# Patient Record
Sex: MALE | Race: WHITE | Employment: OTHER | URBAN - METROPOLITAN AREA
[De-identification: names, ages, dates, MRNs, and addresses within clinical notes are randomized per-mention and may not be internally consistent; named-entity substitution may affect disease eponyms.]

---

## 2017-01-01 ENCOUNTER — HOSPITAL ENCOUNTER (OUTPATIENT)
Dept: OCCUPATIONAL THERAPY | Facility: CLINIC | Age: 77
Setting detail: THERAPIES SERIES
End: 2017-12-14
Attending: INTERNAL MEDICINE
Payer: MEDICARE

## 2017-01-01 ENCOUNTER — TELEPHONE (OUTPATIENT)
Dept: INTERNAL MEDICINE | Facility: CLINIC | Age: 77
End: 2017-01-01

## 2017-01-01 ENCOUNTER — TELEPHONE (OUTPATIENT)
Dept: FAMILY MEDICINE | Facility: CLINIC | Age: 77
End: 2017-01-01

## 2017-01-01 ENCOUNTER — HOSPITAL ENCOUNTER (OUTPATIENT)
Dept: OCCUPATIONAL THERAPY | Facility: CLINIC | Age: 77
Setting detail: THERAPIES SERIES
End: 2017-12-22
Attending: INTERNAL MEDICINE
Payer: MEDICARE

## 2017-01-01 ENCOUNTER — TRANSFERRED RECORDS (OUTPATIENT)
Dept: HEALTH INFORMATION MANAGEMENT | Facility: CLINIC | Age: 77
End: 2017-01-01

## 2017-01-01 ENCOUNTER — OFFICE VISIT (OUTPATIENT)
Dept: INTERNAL MEDICINE | Facility: CLINIC | Age: 77
End: 2017-01-01
Payer: MEDICARE

## 2017-01-01 VITALS
DIASTOLIC BLOOD PRESSURE: 66 MMHG | RESPIRATION RATE: 16 BRPM | HEART RATE: 80 BPM | OXYGEN SATURATION: 86 % | WEIGHT: 242 LBS | SYSTOLIC BLOOD PRESSURE: 118 MMHG | TEMPERATURE: 98.1 F | BODY MASS INDEX: 36.8 KG/M2

## 2017-01-01 DIAGNOSIS — D50.8 OTHER IRON DEFICIENCY ANEMIA: ICD-10-CM

## 2017-01-01 DIAGNOSIS — E87.6 LOW BLOOD POTASSIUM: Primary | ICD-10-CM
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
## 2017-01-01 DIAGNOSIS — E11.649 TYPE 2 DIABETES MELLITUS WITH HYPOGLYCEMIA WITHOUT COMA, WITH LONG-TERM CURRENT USE OF INSULIN (H): Primary | ICD-10-CM

## 2017-01-01 DIAGNOSIS — E87.6 LOW BLOOD POTASSIUM: ICD-10-CM

## 2017-01-01 DIAGNOSIS — I50.9 ACUTE ON CHRONIC CONGESTIVE HEART FAILURE, UNSPECIFIED CONGESTIVE HEART FAILURE TYPE: ICD-10-CM

## 2017-01-01 DIAGNOSIS — E11.649 TYPE 2 DIABETES MELLITUS WITH HYPOGLYCEMIA WITHOUT COMA (H): ICD-10-CM

## 2017-01-01 DIAGNOSIS — E87.6 HYPOKALEMIA: ICD-10-CM

## 2017-01-01 DIAGNOSIS — E11.649 TYPE 2 DIABETES MELLITUS WITH HYPOGLYCEMIA WITHOUT COMA, UNSPECIFIED LONG TERM INSULIN USE STATUS: Primary | ICD-10-CM

## 2017-01-01 DIAGNOSIS — Z79.4 TYPE 2 DIABETES MELLITUS WITH HYPOGLYCEMIA WITHOUT COMA, WITH LONG-TERM CURRENT USE OF INSULIN (H): Primary | ICD-10-CM

## 2017-01-01 DIAGNOSIS — E78.5 HYPERLIPIDEMIA LDL GOAL <100: ICD-10-CM

## 2017-01-01 DIAGNOSIS — E87.6 HYPOKALEMIA: Primary | ICD-10-CM

## 2017-01-01 LAB
ALBUMIN SERPL-MCNC: 3.5 G/DL (ref 3.4–5)
ALP SERPL-CCNC: 79 U/L (ref 40–150)
ALT SERPL W P-5'-P-CCNC: 16 U/L (ref 0–70)
ANION GAP SERPL CALCULATED.3IONS-SCNC: 6 MMOL/L (ref 3–14)
ANION GAP SERPL CALCULATED.3IONS-SCNC: 6 MMOL/L (ref 3–14)
ANION GAP SERPL CALCULATED.3IONS-SCNC: 8 MMOL/L (ref 3–14)
ANION GAP SERPL CALCULATED.3IONS-SCNC: 9 MMOL/L (ref 3–14)
AST SERPL W P-5'-P-CCNC: 20 U/L (ref 0–45)
BILIRUB SERPL-MCNC: 0.7 MG/DL (ref 0.2–1.3)
BUN SERPL-MCNC: 14 MG/DL (ref 7–30)
BUN SERPL-MCNC: 50 MG/DL (ref 7–30)
BUN SERPL-MCNC: 51 MG/DL (ref 7–30)
BUN SERPL-MCNC: 52 MG/DL (ref 7–30)
CALCIUM SERPL-MCNC: 8.5 MG/DL (ref 8.5–10.1)
CALCIUM SERPL-MCNC: 8.6 MG/DL (ref 8.5–10.1)
CALCIUM SERPL-MCNC: 8.7 MG/DL (ref 8.5–10.1)
CALCIUM SERPL-MCNC: 8.8 MG/DL (ref 8.5–10.1)
CHLORIDE SERPL-SCNC: 101 MMOL/L (ref 94–109)
CHLORIDE SERPL-SCNC: 92 MMOL/L (ref 94–109)
CHLORIDE SERPL-SCNC: 93 MMOL/L (ref 94–109)
CHLORIDE SERPL-SCNC: 95 MMOL/L (ref 94–109)
CO2 SERPL-SCNC: 34 MMOL/L (ref 20–32)
CO2 SERPL-SCNC: 35 MMOL/L (ref 20–32)
CO2 SERPL-SCNC: 37 MMOL/L (ref 20–32)
CO2 SERPL-SCNC: 38 MMOL/L (ref 20–32)
CREAT SERPL-MCNC: 0.8 MG/DL (ref 0.66–1.25)
CREAT SERPL-MCNC: 0.94 MG/DL (ref 0.66–1.25)
CREAT SERPL-MCNC: 1.07 MG/DL (ref 0.66–1.25)
CREAT SERPL-MCNC: 1.19 MG/DL (ref 0.66–1.25)
ERYTHROCYTE [DISTWIDTH] IN BLOOD BY AUTOMATED COUNT: 15.1 % (ref 10–15)
GFR SERPL CREATININE-BSD FRML MDRD: 59 ML/MIN/1.7M2
GFR SERPL CREATININE-BSD FRML MDRD: 67 ML/MIN/1.7M2
GFR SERPL CREATININE-BSD FRML MDRD: 77 ML/MIN/1.7M2
GFR SERPL CREATININE-BSD FRML MDRD: ABNORMAL ML/MIN/1.7M2
GLUCOSE SERPL-MCNC: 114 MG/DL (ref 70–99)
GLUCOSE SERPL-MCNC: 161 MG/DL (ref 70–99)
GLUCOSE SERPL-MCNC: 86 MG/DL (ref 70–99)
GLUCOSE SERPL-MCNC: 89 MG/DL (ref 70–99)
HBA1C MFR BLD: 6.1 % (ref 4.3–6)
HCT VFR BLD AUTO: 49.4 % (ref 40–53)
HGB BLD-MCNC: 15.8 G/DL (ref 13.3–17.7)
MCH RBC QN AUTO: 31.5 PG (ref 26.5–33)
MCHC RBC AUTO-ENTMCNC: 32 G/DL (ref 31.5–36.5)
MCV RBC AUTO: 99 FL (ref 78–100)
PLATELET # BLD AUTO: 169 10E9/L (ref 150–450)
POTASSIUM SERPL-SCNC: 2.6 MMOL/L (ref 3.4–5.3)
POTASSIUM SERPL-SCNC: 2.7 MMOL/L (ref 3.4–5.3)
POTASSIUM SERPL-SCNC: 2.8 MMOL/L (ref 3.4–5.3)
POTASSIUM SERPL-SCNC: 3.2 MMOL/L (ref 3.4–5.3)
POTASSIUM SERPL-SCNC: 3.7 MMOL/L (ref 3.4–5.3)
PROT SERPL-MCNC: 7.7 G/DL (ref 6.8–8.8)
RBC # BLD AUTO: 5.01 10E12/L (ref 4.4–5.9)
SODIUM SERPL-SCNC: 136 MMOL/L (ref 133–144)
SODIUM SERPL-SCNC: 137 MMOL/L (ref 133–144)
SODIUM SERPL-SCNC: 138 MMOL/L (ref 133–144)
SODIUM SERPL-SCNC: 143 MMOL/L (ref 133–144)
TSH SERPL DL<=0.005 MIU/L-ACNC: 1.02 MU/L (ref 0.4–4)
WBC # BLD AUTO: 6.9 10E9/L (ref 4–11)

## 2017-01-01 PROCEDURE — 80048 BASIC METABOLIC PNL TOTAL CA: CPT | Performed by: INTERNAL MEDICINE

## 2017-01-01 PROCEDURE — 80048 BASIC METABOLIC PNL TOTAL CA: CPT | Mod: GW | Performed by: INTERNAL MEDICINE

## 2017-01-01 PROCEDURE — 80053 COMPREHEN METABOLIC PANEL: CPT | Mod: GW | Performed by: INTERNAL MEDICINE

## 2017-01-01 PROCEDURE — 83036 HEMOGLOBIN GLYCOSYLATED A1C: CPT | Mod: GW | Performed by: INTERNAL MEDICINE

## 2017-01-01 PROCEDURE — 36415 COLL VENOUS BLD VENIPUNCTURE: CPT | Performed by: INTERNAL MEDICINE

## 2017-01-01 PROCEDURE — 40000125 ZZHC STATISTIC OT OUTPT VISIT: Performed by: OCCUPATIONAL THERAPIST

## 2017-01-01 PROCEDURE — 84443 ASSAY THYROID STIM HORMONE: CPT | Mod: GW | Performed by: INTERNAL MEDICINE

## 2017-01-01 PROCEDURE — 85027 COMPLETE CBC AUTOMATED: CPT | Mod: GW | Performed by: INTERNAL MEDICINE

## 2017-01-01 PROCEDURE — G9169 MEMORY GOAL STATUS: HCPCS | Mod: GO,CJ | Performed by: OCCUPATIONAL THERAPIST

## 2017-01-01 PROCEDURE — 99214 OFFICE O/P EST MOD 30 MIN: CPT | Mod: GW | Performed by: INTERNAL MEDICINE

## 2017-01-01 PROCEDURE — 97535 SELF CARE MNGMENT TRAINING: CPT | Mod: GO

## 2017-01-01 PROCEDURE — 97112 NEUROMUSCULAR REEDUCATION: CPT | Mod: GO

## 2017-01-01 PROCEDURE — 40000125 ZZHC STATISTIC OT OUTPT VISIT

## 2017-01-01 PROCEDURE — 97532 ZZHC OT COGNITIVE SKILLS EA 15 MIN: CPT | Mod: GO | Performed by: OCCUPATIONAL THERAPIST

## 2017-01-01 PROCEDURE — 84132 ASSAY OF SERUM POTASSIUM: CPT | Performed by: INTERNAL MEDICINE

## 2017-01-01 PROCEDURE — 97166 OT EVAL MOD COMPLEX 45 MIN: CPT | Mod: GO | Performed by: OCCUPATIONAL THERAPIST

## 2017-01-01 PROCEDURE — G9168 MEMORY CURRENT STATUS: HCPCS | Mod: GO,CK | Performed by: OCCUPATIONAL THERAPIST

## 2017-01-01 RX ORDER — EXENATIDE 250 UG/ML
INJECTION SUBCUTANEOUS
Qty: 7.2 ML | Refills: 1 | Status: SHIPPED | OUTPATIENT
Start: 2017-01-01 | End: 2018-01-01

## 2017-01-01 RX ORDER — POTASSIUM CHLORIDE 1500 MG/1
20 TABLET, EXTENDED RELEASE ORAL DAILY
Qty: 90 TABLET | Refills: 3 | Status: SHIPPED | OUTPATIENT
Start: 2017-01-01 | End: 2018-01-01 | Stop reason: DRUGHIGH

## 2017-01-01 RX ORDER — PRAVASTATIN SODIUM 80 MG/1
80 TABLET ORAL DAILY
Qty: 90 TABLET | Refills: 3 | Status: SHIPPED | OUTPATIENT
Start: 2017-01-01

## 2017-01-01 RX ORDER — BLOOD SUGAR DIAGNOSTIC
STRIP MISCELLANEOUS
Qty: 5 BOX | Refills: 3 | Status: SHIPPED | OUTPATIENT
Start: 2017-01-01

## 2017-01-01 ASSESSMENT — PAIN SCALES - GENERAL: PAINLEVEL: NO PAIN (0)

## 2017-01-10 ENCOUNTER — TRANSFERRED RECORDS (OUTPATIENT)
Dept: HEALTH INFORMATION MANAGEMENT | Facility: CLINIC | Age: 77
End: 2017-01-10

## 2017-01-13 DIAGNOSIS — F43.21 ADJUSTMENT DISORDER WITH DEPRESSED MOOD: Primary | ICD-10-CM

## 2017-01-26 DIAGNOSIS — Z79.4 TYPE 2 DIABETES MELLITUS WITH HYPOGLYCEMIA WITHOUT COMA, WITH LONG-TERM CURRENT USE OF INSULIN (H): Primary | ICD-10-CM

## 2017-01-26 DIAGNOSIS — E11.649 TYPE 2 DIABETES MELLITUS WITH HYPOGLYCEMIA WITHOUT COMA, WITH LONG-TERM CURRENT USE OF INSULIN (H): Primary | ICD-10-CM

## 2017-01-26 NOTE — TELEPHONE ENCOUNTER
Audra         Last Written Prescription Date: 11/13/2015  Last Fill Quantity: 3 m, # refills: 3  Last Office Visit with G, P or [facility] prescribing provider:  9/28/2016        BP Readings from Last 3 Encounters:   09/28/16 98/58   09/15/16 90/52   08/02/16 110/64     MICROL       18   6/10/2015  No results found for this basename: microalbumin  CREATININE   Date Value Ref Range Status   09/30/2016 1.94* 0.66 - 1.25 mg/dL Final   ]  GFR ESTIMATE   Date Value Ref Range Status   09/30/2016 34* >60 mL/min/1.7m2 Final     Comment:     Non  GFR Calc   09/28/2016 34* >60 mL/min/1.7m2 Final     Comment:     Non  GFR Calc   08/29/2016 54* >60 mL/min/1.7m2 Final     Comment:     Non  GFR Calc     GFR ESTIMATE IF BLACK   Date Value Ref Range Status   09/30/2016 41* >60 mL/min/1.7m2 Final     Comment:      GFR Calc   09/28/2016 42* >60 mL/min/1.7m2 Final     Comment:      GFR Calc   08/29/2016 66 >60 mL/min/1.7m2 Final     Comment:      GFR Calc     CHOL      129   6/10/2015  HDL       29   6/10/2015  LDL       68   6/10/2015  TRIG      162   6/10/2015  CHOLHDLRATIO      4.4   6/10/2015  AST       22   8/2/2016  ALT       23   8/2/2016  A1C      6.1   7/8/2016  A1C      5.6   3/4/2016  A1C      5.6   10/6/2015  A1C      6.7   6/10/2015  A1C      7.4   3/25/2014  POTASSIUM   Date Value Ref Range Status   09/30/2016 5.7* 3.4 - 5.3 mmol/L Final

## 2017-01-27 NOTE — TELEPHONE ENCOUNTER
Lantus  Routing refill request to provider for review/approval because:  Labs out of range:  Microalbumin  Labs not current:  Microalbumin, Hgb A1C - - future labs ordered, routing to schedulers also    Doug Graham RN, BSN

## 2017-02-15 ENCOUNTER — TELEPHONE (OUTPATIENT)
Dept: FAMILY MEDICINE | Facility: CLINIC | Age: 77
End: 2017-02-15

## 2017-02-15 NOTE — TELEPHONE ENCOUNTER
Reason for Call:  Other call back    Detailed comments: Patient's homecare nurse bella wanted to talk to Dr. Khan about giving linda methadone for his back pain. Please call bella.    Phone Number Patient can be reached at: Other phone number:  [phone]    Best Time: any    Can we leave a detailed message on this number? NO    Call taken on 2/15/2017 at 5:06 PM by Debra Ribeiro

## 2017-02-21 ENCOUNTER — TELEPHONE (OUTPATIENT)
Dept: INTERNAL MEDICINE | Facility: CLINIC | Age: 77
End: 2017-02-21

## 2017-02-21 ENCOUNTER — OFFICE VISIT (OUTPATIENT)
Dept: INTERNAL MEDICINE | Facility: CLINIC | Age: 77
End: 2017-02-21
Payer: COMMERCIAL

## 2017-02-21 VITALS
DIASTOLIC BLOOD PRESSURE: 66 MMHG | TEMPERATURE: 97.6 F | OXYGEN SATURATION: 86 % | BODY MASS INDEX: 34.71 KG/M2 | HEIGHT: 68 IN | RESPIRATION RATE: 16 BRPM | SYSTOLIC BLOOD PRESSURE: 108 MMHG | HEART RATE: 76 BPM | WEIGHT: 229 LBS

## 2017-02-21 DIAGNOSIS — M15.9 OSTEOARTHRITIS OF MULTIPLE JOINTS, UNSPECIFIED OSTEOARTHRITIS TYPE: ICD-10-CM

## 2017-02-21 DIAGNOSIS — E66.01 MORBID OBESITY, UNSPECIFIED OBESITY TYPE (H): ICD-10-CM

## 2017-02-21 DIAGNOSIS — J44.1 COPD EXACERBATION (H): Primary | ICD-10-CM

## 2017-02-21 DIAGNOSIS — I10 ESSENTIAL HYPERTENSION WITH GOAL BLOOD PRESSURE LESS THAN 140/90: ICD-10-CM

## 2017-02-21 DIAGNOSIS — R60.1 GENERALIZED EDEMA: ICD-10-CM

## 2017-02-21 DIAGNOSIS — E11.9 TYPE 2 DIABETES MELLITUS WITHOUT COMPLICATION, WITHOUT LONG-TERM CURRENT USE OF INSULIN (H): ICD-10-CM

## 2017-02-21 DIAGNOSIS — J43.9 PULMONARY EMPHYSEMA, UNSPECIFIED EMPHYSEMA TYPE (H): ICD-10-CM

## 2017-02-21 LAB
ANION GAP SERPL CALCULATED.3IONS-SCNC: 5 MMOL/L (ref 3–14)
BUN SERPL-MCNC: 23 MG/DL (ref 7–30)
CALCIUM SERPL-MCNC: 8.8 MG/DL (ref 8.5–10.1)
CHLORIDE SERPL-SCNC: 98 MMOL/L (ref 94–109)
CO2 SERPL-SCNC: 36 MMOL/L (ref 20–32)
CREAT SERPL-MCNC: 0.7 MG/DL (ref 0.66–1.25)
GFR SERPL CREATININE-BSD FRML MDRD: ABNORMAL ML/MIN/1.7M2
GLUCOSE SERPL-MCNC: 111 MG/DL (ref 70–99)
HBA1C MFR BLD: 5.2 % (ref 4.3–6)
POTASSIUM SERPL-SCNC: 3.7 MMOL/L (ref 3.4–5.3)
SODIUM SERPL-SCNC: 139 MMOL/L (ref 133–144)

## 2017-02-21 PROCEDURE — 80048 BASIC METABOLIC PNL TOTAL CA: CPT | Mod: GV | Performed by: INTERNAL MEDICINE

## 2017-02-21 PROCEDURE — 36415 COLL VENOUS BLD VENIPUNCTURE: CPT | Performed by: INTERNAL MEDICINE

## 2017-02-21 PROCEDURE — 99214 OFFICE O/P EST MOD 30 MIN: CPT | Mod: GV | Performed by: INTERNAL MEDICINE

## 2017-02-21 PROCEDURE — 83036 HEMOGLOBIN GLYCOSYLATED A1C: CPT | Mod: GV | Performed by: INTERNAL MEDICINE

## 2017-02-21 RX ORDER — GUAIFENESIN 600 MG/1
600 TABLET, EXTENDED RELEASE ORAL 2 TIMES DAILY PRN
COMMUNITY

## 2017-02-21 RX ORDER — DOXYCYCLINE 100 MG/1
100 CAPSULE ORAL 2 TIMES DAILY
Qty: 20 CAPSULE | Refills: 0 | Status: SHIPPED | OUTPATIENT
Start: 2017-02-21 | End: 2017-05-05

## 2017-02-21 RX ORDER — TRAZODONE HYDROCHLORIDE 50 MG/1
TABLET, FILM COATED ORAL AT BEDTIME
COMMUNITY

## 2017-02-21 ASSESSMENT — PAIN SCALES - GENERAL: PAINLEVEL: SEVERE PAIN (7)

## 2017-02-21 NOTE — MR AVS SNAPSHOT
"              After Visit Summary   2017    Geo Martinez    MRN: [id number]           Patient Information     Date Of Birth          1940        Visit Information        Provider Department      2017 11:00 AM Bob Khan MD [facility]         Follow-ups after your visit        Who to contact     If you have questions or need follow up information about today's clinic visit or your schedule please contact [facility] directly at [phone].  Normal or non-critical lab and imaging results will be communicated to you by [facility], letter or phone within 4 business days after the clinic has received the results. If you do not hear from us within 7 days, please contact the clinic through [facility] or phone. If you have a critical or abnormal lab result, we will notify you by phone as soon as possible.  Submit refill requests through [facility] or call your pharmacy and they will forward the refill request to us. Please allow 3 business days for your refill to be completed.          Additional Information About Your Visit        [facility] Information     [facility] lets you send messages to your doctor, view your test results, renew your prescriptions, schedule appointments and more. To sign up, go to [email or web address] . Click on \"Log in\" on the left side of the screen, which will take you to the Welcome page. Then click on \"Sign up Now\" on the right side of the page.     You will be asked to enter the access code listed below, as well as some personal information. Please follow the directions to create your username and password.     Your access code is: [email or web address]  Expires: 2017 11:01 AM     Your access code will  in 90 days. If you need help or a new code, please call your [facility] or [phone].        Care EveryWhere ID     This is your Care EveryWhere ID. This could be used by other organizations to access your [facility] medical " "records  [id number]        Your Vitals Were     Pulse Temperature Respirations Height Pulse Oximetry BMI (Body Mass Index)    76 97.6  F (36.4  C) (Temporal) 16 5' 8\" (1.727 m) 86% 34.82 kg/m2       Blood Pressure from Last 3 Encounters:   02/21/17 108/66   09/28/16 98/58   09/15/16 90/52    Weight from Last 3 Encounters:   02/21/17 229 lb (103.9 kg)   09/28/16 226 lb (102.5 kg)   09/15/16 228 lb (103.4 kg)              Today, you had the following     No orders found for display         Today's Medication Changes          These changes are accurate as of: 2/21/17 11:01 AM.  If you have any questions, ask your nurse or doctor.               Stop taking these medicines if you haven't already. Please contact your care team if you have questions.     spironolactone 25 MG tablet   Commonly known as:  ALDACTONE   Stopped by:  Bob Khan MD           zolpidem 10 MG tablet   Commonly known as:  AMBIEN   Stopped by:  Bob Khan MD                    Primary Care Provider Office Phone # Fax #    Bob Khan [phone]       [facility] [address] [place] [address]        Goals        General    I would like a visitor in my home so I am not as lonely/Start Date 10/26/2015 (pt-stated)     Notes - Note created  10/27/2015  1:47 PM by Alexia Daily MSW    As of today's date 10/27/2015 goal is met at 26 - 50%.   Goal Status:  Active  Patient signed Consent to Communicate for me to make a referral for him to have a Senior  through [facility]. Patient also has a  come into his home weekly, as well as family members and friends either call, or stop in during the week.          Thank you!     Thank you for choosing [facility]  for your care. Our goal is always to provide you with excellent care. Hearing back from our patients is one way we can continue to improve our services. Please take a few minutes to complete the " written survey that you may receive in the mail after your visit with us. Thank you!             Your Updated Medication List - Protect others around you: Learn how to safely use, store and throw away your medicines at www.disposemymeds.org.          This list is accurate as of: 2/21/17 11:01 AM.  Always use your most recent med list.                   Brand Name Dispense Instructions for use    albuterol 108 (90 BASE) MCG/ACT Inhaler    PROAIR HFA/PROVENTIL HFA/VENTOLIN HFA    1 Inhaler    Inhale 2 puffs into the lungs 4 times daily as needed for shortness of breath / dyspnea       allopurinol 100 MG tablet    ZYLOPRIM    90 tablet    Take 1 tablet (100 mg) by mouth daily       aspirin 81 MG EC tablet     90 tablet    Take 1 tablet (81 mg) by mouth daily       blood glucose monitoring lancets     102 each    Use to test blood sugar three times daily or as directed.       blood glucose monitoring meter device kit     1 kit    Use to test blood sugars 5 times daily or as directed.       carvedilol 6.25 MG tablet    COREG    540 tablet    Take three tablets three times a day       colchicine 0.6 MG tablet    COLCRYS    10 tablet    Take 2 tablets at the first sign of flare, take 1 additional tablet one hour later.       exenatide 10 MCG/0.04ML injection    BYETTA 10 MCG PEN    3 Month    INJECT 10MCG SUBCUTANEOUS TWICE A DAY       ferrous sulfate 325 (65 FE) MG tablet    IRON     Take 325 mg by mouth daily (with breakfast)       Fish Oil 1200 MG Caps      Take 1 capsule by mouth daily       FLUoxetine 20 MG capsule    PROzac    90 capsule    Take 1 capsule (20 mg) by mouth daily       fluticasone-salmeterol 500-50 MCG/DOSE diskus inhaler    ADVAIR    3 Inhaler    Inhale 1 puff into the lungs 2 times daily       insulin glargine 100 UNIT/ML injection    LANTUS SOLOSTAR    45 mL    INJECT 25 UNITS SUBCUTANEOUS 2 TIMES DAILY       * insulin pen needle 31G X 5 MM     3 Box    Use once daily       * insulin pen needle 31G  X 8 MM    B-D U/F    300 each    1 each 4 times daily Use twice daily for lantus and use twice daily for Byetta,  [id number]       lisinopril 20 MG tablet    PRINIVIL/ZESTRIL    90 tablet    Take 1 tablet (20 mg) by mouth daily       MS CONTIN PO      Take 15 mg by mouth every 12 hours Two tabs twice daily       MUCINEX 600 MG 12 hr tablet   Generic drug:  guaiFENesin      Take 600 mg by mouth 2 times daily       multivitamin Tabs tablet      Take 1 tablet by mouth daily       * ONE TOUCH ULTRA BONUS PACK STRP   VI     300 strip    1 strip by In Vitro route 3 times daily.       * ACCU-CHEK JOHN PLUS test strip   Generic drug:  blood glucose monitoring     5 Box    TEST FIVE TIMES DAILY (MD GUERRA)       order for DME     1 Device    Equipment being ordered: Wheelchair-electric, Printed Piece Power mobility device Face to face exam Sept 25th, 2013 Diagnosis COPD(496), CHF (428) Length of need 99 months Weight 303#       oxyCODONE 5 MG IR tablet    ROXICODONE    180 tablet    Take 2 tablets (10 mg) by mouth every 8 hours as needed for pain       pravastatin 80 MG tablet    PRAVACHOL    90 tablet    Take 1 tablet (80 mg) by mouth daily       tiotropium 18 MCG capsule    SPIRIVA HANDIHALER    90 capsule    INHALE ONE DOSE BY MOUTH ONCE DAILY       torsemide 20 MG tablet    DEMADEX    360 tablet    40 mg in AM and 40 mg at noon       traZODone 50 MG tablet    DESYREL     Take by mouth At Bedtime 1-2 tabs at bedtime       triamcinolone acetonide 0.05 % Oint     90 g    Apply Topically two times a day as needed       * Notice:  This list has 4 medication(s) that are the same as other medications prescribed for you. Read the directions carefully, and ask your doctor or other care provider to review them with you.

## 2017-02-21 NOTE — PROGRESS NOTES
SUBJECTIVE:                                                    Geo Martinez is a 76 year old male who presents to clinic today for the following health issues:      Chief Complaint   Patient presents with     Recheck Medication     COPD, CHF and Pain     There was question of if needed methadone.  He has been on MS contin and oxycodone.  His pain can be hard to control.   Been on MS contin for many months, still having pain.  Therefore may try methadone. Only takes oxycodone twice a day as well for pain, does morning and night.     Having some phelgm in his chest, gets it up at times.  Hasn't had any antibiotics in the last month.  Taking nebulizers, which help him.  No fevers but brown sputum.     Taking trazodone at night but not much different in sleep.    Past Medical History   Diagnosis Date     Atrial fibrillation (H)      Chest pain 02/03/10     D/C 02/04/10     Chronic airway obstruction, not elsewhere classified      COPD     Congestive heart failure, unspecified 09/17/2007     Diabetic eye exam (H) 08/15/11     Diabetic eye exam (H) 08/03/12     Gout, unspecified      Hyperkalemia 01/10/09     [facility]charged 01/12/09-[facility]      Chronic low back pain     Mixed hyperlipidemia      Other primary cardiomyopathies      Idiopathic Dilated Cardiomyopathy     Shortness of breath 03/23/09     D/C 03/27/09-[facility]     Type II or unspecified type diabetes mellitus without mention of complication, not stated as uncontrolled      Unspecified essential hypertension      Current Outpatient Prescriptions   Medication     guaiFENesin (MUCINEX) 600 MG 12 hr tablet     traZODone (DESYREL) 50 MG tablet     Morphine Sulfate (MS CONTIN PO)     doxycycline (VIBRAMYCIN) 100 MG capsule     insulin glargine (LANTUS SOLOSTAR) 100 UNIT/ML injection     FLUoxetine (PROZAC) 20 MG capsule     tiotropium (SPIRIVA HANDIHALER) 18 MCG inhalation capsule     oxyCODONE (ROXICODONE) 5 MG immediate  "release tablet     pravastatin (PRAVACHOL) 80 MG tablet     carvedilol (COREG) 6.25 MG tablet     torsemide (DEMADEX) 20 MG tablet     ferrous sulfate (IRON) 325 (65 FE) MG tablet     lisinopril (PRINIVIL,ZESTRIL) 20 MG tablet     exenatide (BYETTA 10 MCG PEN) 10 MCG/0.04ML pen (contains 2.4 ml = 60 doses)     allopurinol (ZYLOPRIM) 100 MG tablet     albuterol (PROAIR HFA, PROVENTIL HFA, VENTOLIN HFA) 108 (90 BASE) MCG/ACT inhaler     multivitamin (OCUVITE) TABS     Omega-3 Fatty Acids (FISH OIL) 1200 MG CAPS     aspirin 81 MG EC tablet     triamcinolone acetonide 0.05 % OINT     fluticasone-salmeterol (ADVAIR) 500-50 MCG/DOSE diskus inhaler     blood glucose monitoring (ACCU-CHEK JOHN PLUS) meter device kit     ACCU-CHEK JOHN PLUS test strip     insulin pen needle (B-D U/F PEN NEEDLE) 31G X 8 MM MISC     ORDER FOR DME     ACCU-CHEK MULTICLIX LANCETS MISC     colchicine (COLCRYS) 0.6 MG tablet     insulin pen needle needle     Glucose Blood (ONE TOUCH ULTRA BONUS PACK STRP   VI)     No current facility-administered medications for this visit.      Social History   Substance Use Topics     Smoking status: Former Smoker     Quit date: 1/1/1986     Smokeless tobacco: Not on file      Comment: quit in 1986 after smoking for 30 years at 1-2 ppd     Alcohol use No     Review of Systems  Constitutional-Tactile fevers.  ENT-No earpain, sore throat, voice changes or rhinitis.  Cardiac-Exertional SOB.  Respiratory-Sputum production.  GI-No nausea, vomitting, diarrhea, constipation, or blood in the stool.  Musculoskeletal-+joint pains.    Physical Exam  /66 (BP Location: Right arm, Patient Position: Chair, Cuff Size: Adult Large)  Pulse 76  Temp 97.6  F (36.4  C) (Temporal)  Resp 16  Ht 5' 8\" (1.727 m)  Wt 229 lb (103.9 kg)  SpO2 (!) 86%  BMI 34.82 kg/m2  General Appearance-alert, no distress  Cardiac-regular rate and rhythm  with normal S1, S2 ; no murmur, rub or gallops  Lungs-mild expiratory rhonchi  Extremities-no " peripheral edema, peripheral pulses normal    ASSESSMENT:  Patient who is now on hospice. He is here for a recheck and to discuss his pain medications and breathing issues. He appears to be having some COPD exacerbation and upper respiratory infection.    COPD exacerbation-we will treat him with doxycycline. I called this in for him. Also discussed with his hospice nurse. He has been on antibiotics twice this year but the last time was earlier in January. Since his been over 3-4 weeks, he had a slight temp, and is brownish sputum we will restart this. He will hold his iron while taking doxycycline due to the interaction on absorption.    Chronic pain and narcotic use-the patient has been on OxyContin, then MS Contin, and still not doing well with full pain control. Hospice was looking to have him on methadone. I am fine with them switch him to methadone. They have an appropriate plan set up for the transition. I explained that hopefully the methadone will give him better pain control. I do not believe he abuses his pain meds he has only been using the p.r.n. oxycodone in the morning and night and he could be encouraged to use that during the day especially when he is having more pain.    Diabetes, patient is doing well we will check an A1c today.    Pulmonary emphysema C above patient continues on oxygen    Hypertension-patient is doing well at goal.    Obesity-he will not be able lose weight with his arthritis and inability to move.    Generalized edema-is doing quite well without any peripheral edema at this time.  I did call these changes to his hospice nurse, Renetta.  He will continue to get his narcotic medications, methadone or MS Contin from the hospice physicians and the local pharmacy.    Electronically signed by Bob Khan MD        Electronically signed by Bob Khan MD

## 2017-02-21 NOTE — TELEPHONE ENCOUNTER
----- Message from Bob Khan MD sent at 2/21/2017 12:25 PM CST -----  Diabetes and kidneys are good.

## 2017-02-21 NOTE — NURSING NOTE
"Chief Complaint   Patient presents with     Recheck Medication     COPD, CHF and Pain       Initial /66 (BP Location: Right arm, Patient Position: Chair, Cuff Size: Adult Large)  Pulse 76  Temp 97.6  F (36.4  C) (Temporal)  Resp 16  Ht 5' 8\" (1.727 m)  Wt 229 lb (103.9 kg)  SpO2 (!) 86%  BMI 34.82 kg/m2 Estimated body mass index is 34.82 kg/(m^2) as calculated from the following:    Height as of this encounter: 5' 8\" (1.727 m).    Weight as of this encounter: 229 lb (103.9 kg).  Medication Reconciliation: complete   Jenn Blair MA    "

## 2017-03-16 ENCOUNTER — MEDICAL CORRESPONDENCE (OUTPATIENT)
Dept: HEALTH INFORMATION MANAGEMENT | Facility: CLINIC | Age: 77
End: 2017-03-16

## 2017-03-30 ENCOUNTER — MEDICAL CORRESPONDENCE (OUTPATIENT)
Dept: HEALTH INFORMATION MANAGEMENT | Facility: CLINIC | Age: 77
End: 2017-03-30

## 2017-04-13 ENCOUNTER — TRANSFERRED RECORDS (OUTPATIENT)
Dept: HEALTH INFORMATION MANAGEMENT | Facility: CLINIC | Age: 77
End: 2017-04-13

## 2017-04-13 DIAGNOSIS — M10.9 GOUT, UNSPECIFIED: ICD-10-CM

## 2017-04-13 NOTE — TELEPHONE ENCOUNTER
.  allopurinol       Last Written Prescription Date: 3/04/16  Last Fill Quantity: 90, # refills: 3  Last Office Visit with [facility], [facility] or [facility] prescribing provider:  2/21/17        Uric Acid   Date Value Ref Range Status   06/10/2015 5.4 3.5 - 7.2 mg/dL Final   ]  Creatinine   Date Value Ref Range Status   02/21/2017 0.70 0.66 - 1.25 mg/dL Final   ]  Lab Results   Component Value Date    WBC 7.5 03/04/2016     Lab Results   Component Value Date    RBC 3.96 03/04/2016     Lab Results   Component Value Date    HGB 12.3 03/04/2016     Lab Results   Component Value Date    HCT 38.7 03/04/2016     No components found for: MCT  Lab Results   Component Value Date    MCV 98 03/04/2016     Lab Results   Component Value Date    MCH 31.1 03/04/2016     Lab Results   Component Value Date    MCHC 31.8 03/04/2016     Lab Results   Component Value Date    RDW 13.4 03/04/2016     Lab Results   Component Value Date     03/04/2016     Lab Results   Component Value Date    AST 22 08/02/2016     Lab Results   Component Value Date    ALT 23 08/02/2016

## 2017-04-14 RX ORDER — ALLOPURINOL 100 MG/1
100 TABLET ORAL DAILY
Qty: 90 TABLET | Refills: 2 | Status: SHIPPED | OUTPATIENT
Start: 2017-04-14 | End: 2018-01-01

## 2017-05-05 ENCOUNTER — ALLIED HEALTH/NURSE VISIT (OUTPATIENT)
Dept: PHARMACY | Facility: CLINIC | Age: 77
End: 2017-05-05
Payer: COMMERCIAL

## 2017-05-05 DIAGNOSIS — G89.29 CHRONIC LOW BACK PAIN, UNSPECIFIED BACK PAIN LATERALITY, WITH SCIATICA PRESENCE UNSPECIFIED: ICD-10-CM

## 2017-05-05 DIAGNOSIS — I10 ESSENTIAL HYPERTENSION WITH GOAL BLOOD PRESSURE LESS THAN 140/90: ICD-10-CM

## 2017-05-05 DIAGNOSIS — M54.5 CHRONIC LOW BACK PAIN, UNSPECIFIED BACK PAIN LATERALITY, WITH SCIATICA PRESENCE UNSPECIFIED: ICD-10-CM

## 2017-05-05 DIAGNOSIS — E11.9 TYPE 2 DIABETES MELLITUS WITHOUT COMPLICATION, WITH LONG-TERM CURRENT USE OF INSULIN (H): ICD-10-CM

## 2017-05-05 DIAGNOSIS — M1A.9XX0 CHRONIC GOUT WITHOUT TOPHUS, UNSPECIFIED CAUSE, UNSPECIFIED SITE: ICD-10-CM

## 2017-05-05 DIAGNOSIS — E78.5 HYPERLIPIDEMIA LDL GOAL <100: ICD-10-CM

## 2017-05-05 DIAGNOSIS — Z79.4 TYPE 2 DIABETES MELLITUS WITHOUT COMPLICATION, WITH LONG-TERM CURRENT USE OF INSULIN (H): ICD-10-CM

## 2017-05-05 DIAGNOSIS — J43.9 PULMONARY EMPHYSEMA, UNSPECIFIED EMPHYSEMA TYPE (H): ICD-10-CM

## 2017-05-05 DIAGNOSIS — F51.01 PRIMARY INSOMNIA: ICD-10-CM

## 2017-05-05 DIAGNOSIS — I50.9 ACUTE ON CHRONIC CONGESTIVE HEART FAILURE, UNSPECIFIED CONGESTIVE HEART FAILURE TYPE: Primary | ICD-10-CM

## 2017-05-05 DIAGNOSIS — R60.9 EDEMA, UNSPECIFIED TYPE: ICD-10-CM

## 2017-05-05 DIAGNOSIS — F43.21 ADJUSTMENT DISORDER WITH DEPRESSED MOOD: ICD-10-CM

## 2017-05-05 DIAGNOSIS — E63.9 NUTRITIONAL DEFICIENCY: ICD-10-CM

## 2017-05-05 DIAGNOSIS — I48.20 CHRONIC ATRIAL FIBRILLATION (H): ICD-10-CM

## 2017-05-05 PROCEDURE — 99607 MTMS BY PHARM ADDL 15 MIN: CPT | Mod: U4 | Performed by: PHARMACIST

## 2017-05-05 PROCEDURE — 99605 MTMS BY PHARM NP 15 MIN: CPT | Mod: U4 | Performed by: PHARMACIST

## 2017-05-05 RX ORDER — METHADONE HYDROCHLORIDE 5 MG/1
10 TABLET ORAL 2 TIMES DAILY
COMMUNITY

## 2017-05-05 RX ORDER — POLYETHYLENE GLYCOL 3350 17 G/17G
1 POWDER, FOR SOLUTION ORAL DAILY PRN
COMMUNITY

## 2017-05-05 RX ORDER — IPRATROPIUM BROMIDE AND ALBUTEROL SULFATE 2.5; .5 MG/3ML; MG/3ML
1 SOLUTION RESPIRATORY (INHALATION) EVERY 6 HOURS PRN
COMMUNITY

## 2017-05-05 RX ORDER — LORAZEPAM 1 MG/1
1 TABLET ORAL AT BEDTIME
COMMUNITY
End: 2018-01-01

## 2017-05-05 NOTE — LETTER
"     [facility] MTM     Date: 2017    Geo Martinez   [address] [place] [id number]    Dear Mr. Martinez,    Thank you for talking with me on 2017 about your health and medications. Medicare s MTM (Medication Therapy Management) program helps you understand your medications and use them safely.      This letter includes an action plan (Medication Action Plan) and medication list (Personal Medication List). The action plan has steps you should take to help you get the best results from your medications. The medication list will help you keep track of your medications and how to use them the right way.       Have your action plan and medication list with you when you talk with your doctors, pharmacists, and other healthcare providers in your care team.     Ask your doctors, pharmacists, and other healthcare providers to update the action plan and medication list at every visit.     Take your medication list with you if you go to the hospital or emergency room.     Give a copy of the action plan and medication list to your family or caregivers.     If you want to talk about this letter or any of the papers with it, please call   [phone].   We look forward to working with you, your doctors, and other healthcare providers to help you stay healthy.     Sincerely,    Louis Jacobo      Enclosed: Medication Action Plan and Personal Medication List    MEDICATION ACTION PLAN FOR Geo Martinez,  1940     This action plan will help you get the best results from your medications if you:   1. Read \"What we talked about.\"   2. Take the steps listed in the \"What I need to do\" boxes.   3. Fill in \"What I did and when I did it.\"   4. Fill in \"My follow-up plan\" and \"Questions I want to ask.\"     Have this action plan with you when you talk with your doctors, pharmacists, and other healthcare providers in your care team. Share this with your family or caregivers " too.  DATE PREPARED: 2017  What we talked about: What my medicines are for, how to know if my medicines are working, made sure my medicines are safe for me and reviewed how to take my medicines.                                                   What I need to do: Take my medicines every day.       What I did and when I did it:                                              My follow-up plan:                 Questions I want to ask:              If you have any questions about your action plan, call Louis Jacobo, Phone: [phone] , Monday-Friday 8-4:30pm.           MEDICATION LIST FOR Geo Martinez,  1940     This medication list was made for you after we talked. We also used information from your doctor's chart.      Use blank rows to add new medications. Then fill in the dates you started using them.    Cross out medications when you no longer use them. Then write the date and why you stopped using them.    Ask your doctors, pharmacists, and other healthcare providers to update this list at every visit. Keep this list up-to-date with:       Prescription medications    Over the counter drugs     Herbals    Vitamins    Minerals      If you go to the hospital or emergency room, take this list with you. Share this with your family or caregivers too.     DATE PREPARED: 2017  Allergies or side effects: Metformin; Prednisone; and Vytorin     Medication:  ALBUTEROL SULFATE  (90 BASE) MCG/ACT IN AERS      How I use it:  Inhale 2 puffs into the lungs 4 times daily as needed for shortness of breath / dyspnea      Why I use it: COPD    Prescriber:  Bob Khan MD      Date I started using it:       Date I stopped using it:         Why I stopped using it:            Medication:  ALLOPURINOL 100 MG PO TABS      How I use it:  Take 1 tablet (100 mg) by mouth daily      Why I use it: Gout    Prescriber:  Bob Khan MD      Date I started using it:       Date I stopped using it:          Why I stopped using it:            Medication:  ASPIRIN 81 MG PO TBEC      How I use it:  Take 1 tablet (81 mg) by mouth daily      Why I use it: Type 2 diabetes    Prescriber:  Bob Khan MD      Date I started using it:       Date I stopped using it:         Why I stopped using it:            Medication:  CARVEDILOL 6.25 MG PO TABS      How I use it:  Take 18.75 mg by mouth 2 times daily (with meals)       Why I use it: Chronic congestive heart failure    Prescriber:  Tomás Walls,       Date I started using it:       Date I stopped using it:         Why I stopped using it:            Medication:  EXENATIDE 10 MCG/0.04ML SC SOPN      How I use it:  INJECT 10MCG SUBCUTANEOUS TWICE A DAY      Why I use it: Type 2 diabetes    Prescriber:  Bob Khan MD      Date I started using it:       Date I stopped using it:         Why I stopped using it:            Medication:  EYE VITAMINS & MINERALS PO TABS      How I use it:  Take 2 tablets by mouth daily (Focus Brand)      Why I use it:  Eye Health    Prescriber:  Patient Reported      Date I started using it:       Date I stopped using it:         Why I stopped using it:            Medication:  FERROUS SULFATE 325 (65 FE) MG PO TABS      How I use it:  Take 325 mg by mouth daily (with breakfast)      Why I use it:  Anemia    Prescriber:  Patient Reported      Date I started using it:       Date I stopped using it:         Why I stopped using it:            Medication:  FISH OIL 1200 MG PO CAPS      How I use it:  Take 1 capsule by mouth 2 times daily       Why I use it:  High Cholesterol    Prescriber:  Patient Reported      Date I started using it:       Date I stopped using it:         Why I stopped using it:            Medication:  FLUOXETINE HCL 20 MG PO CAPS      How I use it:  Take 1 capsule (20 mg) by mouth daily      Why I use it: Adjustment disorder with depressed mood    Prescriber:  Bob Khan MD      Date I started using it:        Date I stopped using it:         Why I stopped using it:            Medication:  FLUTICASONE-SALMETEROL 500-50 MCG/DOSE IN AEPB      How I use it:  Inhale 1 puff into the lungs 2 times daily      Why I use it: COPD    Prescriber:  Bob Khan MD      Date I started using it:       Date I stopped using it:         Why I stopped using it:            Medication:  GUAIFENESIN  MG PO TB12      How I use it:  Take 600 mg by mouth 2 times daily as needed       Why I use it:  Cough    Prescriber:  Patient Reported      Date I started using it:       Date I stopped using it:         Why I stopped using it:            Medication:  INSULIN GLARGINE  UNIT/ML SC SOPN      How I use it:  INJECT 25 UNITS SUBCUTANEOUS 2 TIMES DAILY      Why I use it: Type 2 diabetes    Prescriber:  Bbo Khan MD      Date I started using it:       Date I stopped using it:         Why I stopped using it:            Medication:  IPRATROPIUM-ALBUTEROL 0.5-2.5 (3) MG/3ML IN SOLN      How I use it:  Take 1 vial by nebulization every 6 hours as needed for shortness of breath / dyspnea or wheezing      Why I use it:  COPD    Prescriber:  Patient Reported      Date I started using it:       Date I stopped using it:         Why I stopped using it:            Medication:  LISINOPRIL 20 MG PO TABS      How I use it:  Take 1 tablet (20 mg) by mouth daily      Why I use it: Chronic congestive heart failure    Prescriber:  Bob Khan MD      Date I started using it:       Date I stopped using it:         Why I stopped using it:            Medication:  LORAZEPAM 1 MG PO TABS      How I use it:  Take 1 mg by mouth At Bedtime      Why I use it:  Anxiety, Insomnia    Prescriber:  Patient Reported      Date I started using it:       Date I stopped using it:         Why I stopped using it:            Medication:  METHADONE HCL 5 MG PO TABS      How I use it:  Take 10 mg by mouth 2 times daily      Why I use it:  Pain    Prescriber:  Patient  Reported      Date I started using it:       Date I stopped using it:         Why I stopped using it:            Medication:  NUTRITIONAL SUPPLEMENT PO      How I use it:  Take 1 tablet by mouth daily (Resetigen-D)      Why I use it:  General health    Prescriber:  Patient Reported      Date I started using it:       Date I stopped using it:         Why I stopped using it:            Medication:  OXYCODONE HCL 5 MG PO TABS      How I use it:  Take 2 tablets (10 mg) by mouth every 8 hours as needed for pain      Why I use it: Chronic low back pain    Prescriber:  Bob Khan MD      Date I started using it:       Date I stopped using it:         Why I stopped using it:            Medication:  POLYETHYL GLYCOL-PROPYL GLYCOL 0.4-0.3 % OP SOLN      How I use it:  Place 1 drop into both eyes 4 times daily as needed for dry eyes      Why I use it:  Dry Eyes    Prescriber:  Patient Reported      Date I started using it:       Date I stopped using it:         Why I stopped using it:            Medication:  POLYETHYLENE GLYCOL 3350 PO POWD      How I use it:  Take 1 capful by mouth daily as needed for constipation      Why I use it:  Constipation    Prescriber:  Patient Reported      Date I started using it:       Date I stopped using it:         Why I stopped using it:            Medication:  PRAVASTATIN SODIUM 80 MG PO TABS      How I use it:  Take 1 tablet (80 mg) by mouth daily      Why I use it: Hyperlipidemia     Prescriber:  Bob Khan MD      Date I started using it:       Date I stopped using it:         Why I stopped using it:            Medication:  TIOTROPIUM BROMIDE MONOHYDRATE 18 MCG IN CAPS      How I use it:  INHALE ONE DOSE BY MOUTH ONCE DAILY      Why I use it: COPD    Prescriber:  Bob Khan MD      Date I started using it:       Date I stopped using it:         Why I stopped using it:            Medication:  TORSEMIDE 20 MG PO TABS      How I use it:  40 mg in AM and 40 mg at noon      Why I  use it: Heart Failure, Leg Swelling    Prescriber:  Jonathan Bates MD      Date I started using it:       Date I stopped using it:         Why I stopped using it:            Medication:  TRAZODONE HCL 50 MG PO TABS      How I use it:  Take by mouth At Bedtime 1-2 tabs at bedtime      Why I use it:  Insomnia    Prescriber:  Patient Reported      Date I started using it:       Date I stopped using it:         Why I stopped using it:            Medication:  TRIAMCINOLONE ACETONIDE 0.05 % EX OINT      How I use it:  Apply Topically two times a day as needed      Why I use it: Rash    Prescriber:  Bob Khan MD      Date I started using it:       Date I stopped using it:         Why I stopped using it:            Medication:         How I use it:         Why I use it:      Prescriber:         Date I started using it:       Date I stopped using it:         Why I stopped using it:            Medication:         How I use it:         Why I use it:      Prescriber:         Date I started using it:       Date I stopped using it:         Why I stopped using it:            Medication:         How I use it:         Why I use it:      Prescriber:         Date I started using it:       Date I stopped using it:         Why I stopped using it:              Other Information:     If you have any questions about your action plan, call [phone].    According to the Paperwork Reduction Act of 1995, no persons are required to respond to a collection of information unless it displays a valid OMB control number. The valid OMB number for this information collection is [id number]. The time required to complete this information collection is estimated to average 40 minutes per response, including the time to review instructions, searching existing data resources, gather the data needed, and complete and review the information collection. If you have any comments concerning the accuracy of the time estimate(s) or suggestions for  improving this form, please write to: CMS, Attn: N[employer] Reports Clearance Officer, [address] [place] [address], [place], [place] [address].

## 2017-05-05 NOTE — MR AVS SNAPSHOT
"              After Visit Summary   5/5/2017    Geo Martinez    MRN: [id number]           Patient Information     Date Of Birth          1940        Visit Information        Provider Department      5/5/2017 11:00 AM Louis Jacobo [facility]        Today's Diagnoses     Acute on chronic congestive heart failure, unspecified congestive heart failure type (H)    -  1    Essential hypertension with goal blood pressure less than 140/90        Chronic atrial fibrillation (H)        Edema, unspecified type        Pulmonary emphysema, unspecified emphysema type (H)        Type 2 diabetes mellitus without complication, with long-term current use of insulin (H)        Chronic gout without tophus, unspecified cause, unspecified site        Hyperlipidemia LDL goal <100        Chronic low back pain, unspecified back pain laterality, with sciatica presence unspecified        Adjustment disorder with depressed mood        Primary insomnia        Nutritional deficiency          Care Instructions    Sent via letter \"Medication Action Plan\"        Follow-ups after your visit        Who to contact     If you have questions or need follow up information about today's clinic visit or your schedule please contact [facility] directly at [phone].  Normal or non-critical lab and imaging results will be communicated to you by [employer], letter or phone within 4 business days after the clinic has received the results. If you do not hear from us within 7 days, please contact the clinic through [employer] or phone. If you have a critical or abnormal lab result, we will notify you by phone as soon as possible.  Submit refill requests through [employer] or call your pharmacy and they will forward the refill request to us. Please allow 3 business days for your refill to be completed.          Additional Information About Your Visit        [employer] Information     We[employer] lets you send " "messages to your doctor, view your test results, renew your prescriptions, schedule appointments and more. To sign up, go to [email or web address] . Click on \"Log in\" on the left side of the screen, which will take you to the Welcome page. Then click on \"Sign up Now\" on the right side of the page.     You will be asked to enter the access code listed below, as well as some personal information. Please follow the directions to create your username and password.     Your access code is: [email or web address]  Expires: 2017 12:01 PM     Your access code will  in 90 days. If you need help or a new code, please call your [facility] clinic or [phone].        Care EveryWhere ID     This is your Care EveryWhere ID. This could be used by other organizations to access your [facility] medical records  [id number]         Blood Pressure from Last 3 Encounters:   17 108/66   16 98/58   09/15/16 90/52    Weight from Last 3 Encounters:   17 229 lb (103.9 kg)   16 226 lb (102.5 kg)   09/15/16 228 lb (103.4 kg)              Today, you had the following     No orders found for display         Today's Medication Changes          These changes are accurate as of: 17  4:33 PM.  If you have any questions, ask your nurse or doctor.               Stop taking these medicines if you haven't already. Please contact your care team if you have questions.     MS CONTIN PO                    Primary Care Provider Office Phone # Fax #    Bob Khan [phone]       [facility] [address] [place] [address]        Goals        General    I would like a visitor in my home so I am not as lonely/Start Date 10/26/2015 (pt-stated)     Notes - Note created  10/27/2015  1:47 PM by Alexia Daily MSW    As of today's date 10/27/2015 goal is met at 26 - 50%.   Goal Status:  Active  Patient signed Consent to Communicate for me to make a referral for him to have a Senior  " through [facility]. Patient also has a  come into his home weekly, as well as family members and friends either call, or stop in during the week.          Thank you!     Thank you for choosing [facility]  for your care. Our goal is always to provide you with excellent care. Hearing back from our patients is one way we can continue to improve our services. Please take a few minutes to complete the written survey that you may receive in the mail after your visit with us. Thank you!             Your Updated Medication List - Protect others around you: Learn how to safely use, store and throw away your medicines at www.disposemymeds.org.          This list is accurate as of: 5/5/17  4:33 PM.  Always use your most recent med list.                   Brand Name Dispense Instructions for use    ACCU-CHEK JOHN PLUS test strip   Generic drug:  blood glucose monitoring     5 Box    TEST FIVE TIMES DAILY (MD GUERRA)       albuterol 108 (90 BASE) MCG/ACT Inhaler    PROAIR HFA/PROVENTIL HFA/VENTOLIN HFA    1 Inhaler    Inhale 2 puffs into the lungs 4 times daily as needed for shortness of breath / dyspnea       allopurinol 100 MG tablet    ZYLOPRIM    90 tablet    Take 1 tablet (100 mg) by mouth daily       aspirin 81 MG EC tablet     90 tablet    Take 1 tablet (81 mg) by mouth daily       blood glucose monitoring lancets     102 each    Use to test blood sugar three times daily or as directed.       blood glucose monitoring meter device kit     1 kit    Use to test blood sugars 5 times daily or as directed.       carvedilol 6.25 MG tablet    COREG    540 tablet    Take 18.75 mg by mouth 2 times daily (with meals)       exenatide 10 MCG/0.04ML injection    BYETTA 10 MCG PEN    3 Month    INJECT 10MCG SUBCUTANEOUS TWICE A DAY       EYE VITAMINS & MINERALS Tabs      Take 2 tablets by mouth daily (Focus Brand)       ferrous sulfate 325 (65 FE) MG tablet    IRON     Take 325 mg by  mouth daily (with breakfast)       Fish Oil 1200 MG Caps      Take 1 capsule by mouth 2 times daily       FLUoxetine 20 MG capsule    PROzac    90 capsule    Take 1 capsule (20 mg) by mouth daily       fluticasone-salmeterol 500-50 MCG/DOSE diskus inhaler    ADVAIR    3 Inhaler    Inhale 1 puff into the lungs 2 times daily       insulin glargine 100 UNIT/ML injection    LANTUS SOLOSTAR    45 mL    INJECT 25 UNITS SUBCUTANEOUS 2 TIMES DAILY       insulin pen needle 31G X 8 MM    B-D U/F    300 each    1 each 4 times daily Use twice daily for lantus and use twice daily for Byeladio,  [id number]       ipratropium - albuterol 0.5 mg/2.5 mg/3 mL 0.5-2.5 (3) MG/3ML neb solution    DUONEB     Take 1 vial by nebulization every 6 hours as needed for shortness of breath / dyspnea or wheezing       lisinopril 20 MG tablet    PRINIVIL/ZESTRIL    90 tablet    Take 1 tablet (20 mg) by mouth daily       LORazepam 1 MG tablet    ATIVAN     Take 1 mg by mouth At Bedtime       methadone 5 MG tablet    DOLOPHINE     Take 10 mg by mouth 2 times daily       MUCINEX 600 MG 12 hr tablet   Generic drug:  guaiFENesin      Take 600 mg by mouth 2 times daily as needed       NUTRITIONAL SUPPLEMENT PO      Take 1 tablet by mouth daily (Resetigen-D)       order for DME     1 Device    Equipment being ordered: Wheelchair-electric, hover[employer] Power mobility device Face to face exam Sept 25th, 2013 Diagnosis COPD(496), CHF (428) Length of need 99 months Weight 303#       oxyCODONE 5 MG IR tablet    ROXICODONE    180 tablet    Take 2 tablets (10 mg) by mouth every 8 hours as needed for pain       polyethylene glycol powder    MIRALAX/GLYCOLAX     Take 1 capful by mouth daily as needed for constipation       pravastatin 80 MG tablet    PRAVACHOL    90 tablet    Take 1 tablet (80 mg) by mouth daily       SYSTANE 0.4-0.3 % Soln ophthalmic solution   Generic drug:  polyethylene glycol 0.4%- propylene glycol 0.3%      Place 1 drop into both eyes 4 times  daily as needed for dry eyes       tiotropium 18 MCG capsule    SPIRIVA HANDIHALER    90 capsule    INHALE ONE DOSE BY MOUTH ONCE DAILY       torsemide 20 MG tablet    DEMADEX    360 tablet    40 mg in AM and 40 mg at noon       traZODone 50 MG tablet    DESYREL     Take by mouth At Bedtime 1-2 tabs at bedtime       triamcinolone acetonide 0.05 % Oint     90 g    Apply Topically two times a day as needed

## 2017-07-13 NOTE — TELEPHONE ENCOUNTER
Pravastatin       Last Written Prescription Date: 8/15/16  Last Fill Quantity: 901, # refills: 3    Last Office Visit with [facility], [facility] or  Health prescribing provider:   2/21/17       Accu-Check [address]      Last Written Prescription Date: 12/14/15  Last Fill Quantity: 1,  # refills: 0   Last Office Visit with [facility], [facility] or [facility] prescribing provider: 2/21/17                                                 Cholesterol   Date Value Ref Range Status   06/10/2015 129 <200 mg/dL Final     Comment:     LDL Cholesterol is the primary guide to therapy.   The NCEP recommends further evaluation of: patients with cholesterol greater   than 200 mg/dL if additional risk factors are present, cholesterol greater   than   240 mg/dL, triglycerides greater than 150 mg/dL, or HDL less than 40 mg/dL.       HDL Cholesterol   Date Value Ref Range Status   06/10/2015 29 (L) >40 mg/dL Final     LDL Cholesterol Calculated   Date Value Ref Range Status   06/10/2015 68 0 - 129 mg/dL Final     Comment:     LDL Cholesterol is the primary guide to therapy: LDL-cholesterol goal in high   risk patients is <100 mg/dL and in very high risk patients is <70 mg/dL.       Triglycerides   Date Value Ref Range Status   06/10/2015 162 (H) 0 - 150 mg/dL Final     Comment:     Fasting specimen     Cholesterol/HDL Ratio   Date Value Ref Range Status   06/10/2015 4.4 0.0 - 5.0 Final     ALT   Date Value Ref Range Status   08/02/2016 23 0 - 70 U/L Final

## 2017-07-17 NOTE — TELEPHONE ENCOUNTER
Routing refill request to provider for review/approval because:  Labs not current:  FLP -Routing to provider.     Schedulers, please schedule patient for labs.

## 2017-11-17 NOTE — TELEPHONE ENCOUNTER
Requested Prescriptions   Pending Prescriptions Disp Refills     BYETTA 10 MCG PEN 10 MCG/0.04ML injection [Pharmacy Med Name: BYETTA 10 MCG PEN 10 SOPN] 7.2 mL 1     Sig: INJECT 10MCG SUBCUTANEOUSLY TWO TIMES A DAY    GLP-1 Agonists Protocol Failed    11/17/2017  9:49 AM       Failed - Blood pressure less than 140/90 in past 6 months    BP Readings from Last 3 Encounters:   02/21/17 108/66   09/28/16 98/58   09/15/16 90/52                Failed - LDL on file in past 12 months    Recent Labs   Lab Test  06/10/15   1024   LDL  68            Failed - Microalbumin on file in past 12 months    Recent Labs   Lab Test  06/10/15   1031   MICROL  18   UMALCR  42.14*            Failed - HgbA1C in past 3 or 6 months    Recent Labs   Lab Test  02/21/17   1128   A1C  5.2            Passed - Patient is age 18 or older       Passed - A normal serum creatinine on file in past 12 months    Recent Labs   Lab Test  07/20/17   1525   CR  0.80            Passed - Recent visit with authorizing provider's specialty in past 6 months    IV to PO - Antibiotics     None

## 2017-11-27 NOTE — MR AVS SNAPSHOT
"              After Visit Summary   2017    Geo Martinez    MRN: [id number]           Patient Information     Date Of Birth          1940        Visit Information        Provider Department      2017 1:30 PM Bob Khan MD [facility]         Follow-ups after your visit        Who to contact     If you have questions or need follow up information about today's clinic visit or your schedule please contact [facility] directly at [phone].  Normal or non-critical lab and imaging results will be communicated to you by Youtopiahart, letter or phone within 4 business days after the clinic has received the results. If you do not hear from us within 7 days, please contact the clinic through Youtopiahart or phone. If you have a critical or abnormal lab result, we will notify you by phone as soon as possible.  Submit refill requests through Yippy or call your pharmacy and they will forward the refill request to us. Please allow 3 business days for your refill to be completed.          Additional Information About Your Visit        [facility] Information     Yippy lets you send messages to your doctor, view your test results, renew your prescriptions, schedule appointments and more. To sign up, go to [email or web address] . Click on \"Log in\" on the left side of the screen, which will take you to the Welcome page. Then click on \"Sign up Now\" on the right side of the page.     You will be asked to enter the access code listed below, as well as some personal information. Please follow the directions to create your username and password.     Your access code is: [email or web address]  Expires: 2018  1:43 PM     Your access code will  in 90 days. If you need help or a new code, please call your [facility] or [phone].        Care EveryWhere ID     This is your Care EveryWhere ID. This could be used by other organizations to access your [facility] medical " records  [id number]        Your Vitals Were     Pulse Temperature Respirations Pulse Oximetry BMI (Body Mass Index)       80 98.1  F (36.7  C) (Temporal) 16 86% 36.8 kg/m2        Blood Pressure from Last 3 Encounters:   11/27/17 118/66   02/21/17 108/66   09/28/16 98/58    Weight from Last 3 Encounters:   11/27/17 242 lb (109.8 kg)   02/21/17 229 lb (103.9 kg)   09/28/16 226 lb (102.5 kg)              Today, you had the following     No orders found for display         Today's Medication Changes          These changes are accurate as of: 11/27/17  1:43 PM.  If you have any questions, ask your nurse or doctor.               Stop taking these medicines if you haven't already. Please contact your care team if you have questions.     fluticasone-salmeterol 500-50 MCG/DOSE diskus inhaler   Commonly known as:  ADVAIR   Stopped by:  oBb Khan MD           tiotropium 18 MCG capsule   Commonly known as:  SPIRIVA HANDIHALER   Stopped by:  Bob Khan MD                    Primary Care Provider Office Phone # Fax #    Bob Khan [phone]       [facility] [address] [place] [address]        Goals        General    I would like a visitor in my home so I am not as lonely/Start Date 10/26/2015 (pt-stated)     Notes - Note created  10/27/2015  1:47 PM by Alexia Daily MSW    As of today's date 10/27/2015 goal is met at 26 - 50%.   Goal Status:  Active  Patient signed Consent to Communicate for me to make a referral for him to have a Senior  through [facility]. Patient also has a  come into his home weekly, as well as family members and friends either call, or stop in during the week.          Equal Access to Services     [facility] : Alethea Jackson, price nguyễn, alin kaalsheila patterson, anselmo hanley. So [facility] [phone].    ATENCIÓN: Si beatrice felix a christian disposición  servicios gratuitos de asistencia lingüística. Chandrika chanel [phone].    We comply with applicable federal civil rights laws and [place] laws. We do not discriminate on the basis of race, color, national origin, age, disability, sex, sexual orientation, or gender identity.            Thank you!     Thank you for choosing [facility]  for your care. Our goal is always to provide you with excellent care. Hearing back from our patients is one way we can continue to improve our services. Please take a few minutes to complete the written survey that you may receive in the mail after your visit with us. Thank you!             Your Updated Medication List - Protect others around you: Learn how to safely use, store and throw away your medicines at www.disposemymeds.org.          This list is accurate as of: 11/27/17  1:43 PM.  Always use your most recent med list.                   Brand Name Dispense Instructions for use Diagnosis    ACCU-CHEK JOHN PLUS test strip   Generic drug:  blood glucose monitoring     5 Box    TEST FIVE TIMES DAILY (MD GUERRA)    Type 2 diabetes mellitus with hypoglycemia without coma (H)       albuterol 108 (90 BASE) MCG/ACT Inhaler    PROAIR HFA/PROVENTIL HFA/VENTOLIN HFA    1 Inhaler    Inhale 2 puffs into the lungs 4 times daily as needed for shortness of breath / dyspnea    Chronic diastolic congestive heart failure (H)       allopurinol 100 MG tablet    ZYLOPRIM    90 tablet    Take 1 tablet (100 mg) by mouth daily    Gout, unspecified       aspirin 81 MG EC tablet     90 tablet    Take 1 tablet (81 mg) by mouth daily    Type 2 diabetes, HbA1C goal < 8% (H)       blood glucose monitoring lancets     102 each    Use to test blood sugar three times daily or as directed.    Type 2 diabetes, HbA1C goal < 8% (H)       blood glucose monitoring meter device kit     1 kit    Use to test blood sugars 5 times daily or as directed.    Type 2 diabetes mellitus without complication (H)        BYETTA 10 MCG PEN 10 MCG/0.04ML injection   Generic drug:  exenatide     7.2 mL    INJECT 10MCG SUBCUTANEOUSLY TWO TIMES A DAY    Type 2 diabetes mellitus with hypoglycemia without coma, unspecified long term insulin use status (H)       carvedilol 6.25 MG tablet    COREG    540 tablet    Take 18.75 mg by mouth 2 times daily (with meals)    Acute on chronic congestive heart failure, unspecified congestive heart failure type (H)       EYE VITAMINS & MINERALS Tabs      Take 2 tablets by mouth daily (Focus Brand)        ferrous sulfate 325 (65 FE) MG tablet    IRON     Take 325 mg by mouth daily (with breakfast)        fish Oil 1200 MG capsule      Take 1 capsule by mouth 2 times daily        FLUoxetine 20 MG capsule    PROzac    90 capsule    Take 1 capsule (20 mg) by mouth daily    Adjustment disorder with depressed mood       insulin glargine 100 UNIT/ML injection    LANTUS SOLOSTAR    45 mL    INJECT 25 UNITS SUBCUTANEOUS 2 TIMES DAILY    Type 2 diabetes mellitus with hypoglycemia without coma, with long-term current use of insulin (H)       insulin pen needle 31G X 8 MM    B-D U/F    300 each    1 each 4 times daily Use twice daily for lantus and use twice daily for Byetta,  Dx-250.00    Type 2 diabetes, HbA1C goal < 8% (H)       ipratropium - albuterol 0.5 mg/2.5 mg/3 mL 0.5-2.5 (3) MG/3ML neb solution    DUONEB     Take 1 vial by nebulization every 6 hours as needed for shortness of breath / dyspnea or wheezing        lisinopril 20 MG tablet    PRINIVIL/ZESTRIL    90 tablet    Take 1 tablet (20 mg) by mouth daily    Acute on chronic congestive heart failure, unspecified congestive heart failure type (H)       LORazepam 1 MG tablet    ATIVAN     Take 1 mg by mouth At Bedtime        methadone 5 MG tablet    DOLOPHINE     Take 10 mg by mouth 2 times daily        MUCINEX 600 MG 12 hr tablet   Generic drug:  guaiFENesin      Take 600 mg by mouth 2 times daily as needed        NUTRITIONAL SUPPLEMENT PO      Take 1  tablet by mouth daily (Resetigen-D)        order for DME     1 Device    Equipment being ordered: Wheelchair-electric, hoverGluster Power mobility device Face to face exam Sept 25th, 2013 Diagnosis COPD(496), CHF (428) Length of need 99 months Weight 303#    Chronic airway obstruction, not elsewhere classified, Congestive heart failure, unspecified       oxyCODONE IR 5 MG tablet    ROXICODONE    180 tablet    Take 2 tablets (10 mg) by mouth every 8 hours as needed for pain    Chronic low back pain, unspecified back pain laterality, with sciatica presence unspecified       polyethylene glycol powder    MIRALAX/GLYCOLAX     Take 1 capful by mouth daily as needed for constipation        pravastatin 80 MG tablet    PRAVACHOL    90 tablet    Take 1 tablet (80 mg) by mouth daily    Hyperlipidemia LDL goal <100       SYSTANE 0.4-0.3 % Soln ophthalmic solution   Generic drug:  polyethylene glycol 0.4%- propylene glycol 0.3%      Place 1 drop into both eyes 4 times daily as needed for dry eyes        torsemide 20 MG tablet    DEMADEX    360 tablet    40 mg in AM and 40 mg at noon    Generalized edema       traZODone 50 MG tablet    DESYREL     Take by mouth At Bedtime 1-2 tabs at bedtime        triamcinolone acetonide 0.05 % Oint     90 g    Apply Topically two times a day as needed    Rash
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
## 2017-11-27 NOTE — TELEPHONE ENCOUNTER
----- Message from Bob Khan MD sent at 11/27/2017  3:34 PM CST -----  Potassium is very low, needs to get on oral supplement.  Was previously high. Kidneys are now better.  Should go on potassium 20 meq daily, but today take 80 meq of potassium now. Recheck potassium on Wed

## 2017-11-27 NOTE — TELEPHONE ENCOUNTER
Pt was advised of the lab results and recommendations from Dr. Khan. Pt would like a RX sent o [facility].
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
## 2017-11-30 NOTE — TELEPHONE ENCOUNTER
----- Message from Bob Khan MD sent at 11/29/2017  4:43 PM CST -----  Potassium is improved but still low, take 2 of the 20 meq tablets daily , recheck a basic panel next week.

## 2017-12-01 NOTE — TELEPHONE ENCOUNTER
Patient returned call. I relayed results message below and he had no further questions or concerns.     Thank you  Morris Matamoros  Patient Representative

## 2017-12-08 NOTE — TELEPHONE ENCOUNTER
----- Message from Bob Khan MD sent at 12/8/2017  7:43 AM CST -----  Potassium is still low, take 60 meq today and go up to 40mg eq of KCL daily, recheck basic in 1 week

## 2017-12-08 NOTE — TELEPHONE ENCOUNTER
Pt was advised of the lab results and verbalized understanding. Pt verbalized understanding of the recommendations from Dr. Khan. Lab orders placed.
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
## 2017-12-18 NOTE — ADDENDUM NOTE
Encounter addended by: Alyssa Uribe OT on: 12/18/2017 11:44 AM<BR>     Actions taken: Episode edited, Flowsheet accepted, Sign clinical note

## 2018-01-01 ENCOUNTER — NURSE TRIAGE (OUTPATIENT)
Dept: NURSING | Facility: CLINIC | Age: 78
End: 2018-01-01

## 2018-01-01 ENCOUNTER — MEDICAL CORRESPONDENCE (OUTPATIENT)
Dept: HEALTH INFORMATION MANAGEMENT | Facility: CLINIC | Age: 78
End: 2018-01-01

## 2018-01-01 ENCOUNTER — OFFICE VISIT (OUTPATIENT)
Dept: AUDIOLOGY | Facility: CLINIC | Age: 78
End: 2018-01-01
Payer: COMMERCIAL

## 2018-01-01 ENCOUNTER — OFFICE VISIT (OUTPATIENT)
Dept: OTOLARYNGOLOGY | Facility: OTHER | Age: 78
End: 2018-01-01
Payer: COMMERCIAL

## 2018-01-01 ENCOUNTER — OFFICE VISIT (OUTPATIENT)
Dept: INTERNAL MEDICINE | Facility: CLINIC | Age: 78
End: 2018-01-01
Payer: COMMERCIAL

## 2018-01-01 ENCOUNTER — OFFICE VISIT (OUTPATIENT)
Dept: AUDIOLOGY | Facility: OTHER | Age: 78
End: 2018-01-01
Payer: COMMERCIAL

## 2018-01-01 ENCOUNTER — TELEPHONE (OUTPATIENT)
Dept: INTERNAL MEDICINE | Facility: CLINIC | Age: 78
End: 2018-01-01

## 2018-01-01 ENCOUNTER — OFFICE VISIT (OUTPATIENT)
Dept: OTOLARYNGOLOGY | Facility: CLINIC | Age: 78
End: 2018-01-01
Payer: COMMERCIAL

## 2018-01-01 ENCOUNTER — TRANSFERRED RECORDS (OUTPATIENT)
Dept: HEALTH INFORMATION MANAGEMENT | Facility: CLINIC | Age: 78
End: 2018-01-01

## 2018-01-01 VITALS
HEIGHT: 67 IN | RESPIRATION RATE: 16 BRPM | WEIGHT: 237 LBS | BODY MASS INDEX: 37.2 KG/M2 | HEART RATE: 68 BPM | OXYGEN SATURATION: 93 %

## 2018-01-01 VITALS
TEMPERATURE: 97.7 F | BODY MASS INDEX: 37.2 KG/M2 | WEIGHT: 237 LBS | HEIGHT: 67 IN | DIASTOLIC BLOOD PRESSURE: 74 MMHG | RESPIRATION RATE: 16 BRPM | HEART RATE: 68 BPM | OXYGEN SATURATION: 93 % | SYSTOLIC BLOOD PRESSURE: 122 MMHG

## 2018-01-01 VITALS
HEIGHT: 67 IN | DIASTOLIC BLOOD PRESSURE: 60 MMHG | OXYGEN SATURATION: 90 % | WEIGHT: 237 LBS | SYSTOLIC BLOOD PRESSURE: 100 MMHG | BODY MASS INDEX: 37.2 KG/M2

## 2018-01-01 VITALS — OXYGEN SATURATION: 91 % | HEART RATE: 56 BPM | BODY MASS INDEX: 37.12 KG/M2 | WEIGHT: 237 LBS

## 2018-01-01 VITALS — HEART RATE: 66 BPM | WEIGHT: 237 LBS | BODY MASS INDEX: 37.12 KG/M2 | OXYGEN SATURATION: 93 %

## 2018-01-01 DIAGNOSIS — H90.3 SENSORINEURAL HEARING LOSS, BILATERAL: Primary | ICD-10-CM

## 2018-01-01 DIAGNOSIS — M1A.9XX0 CHRONIC GOUT WITHOUT TOPHUS, UNSPECIFIED CAUSE, UNSPECIFIED SITE: Primary | ICD-10-CM

## 2018-01-01 DIAGNOSIS — M10.9 GOUT: ICD-10-CM

## 2018-01-01 DIAGNOSIS — Z79.4 TYPE 2 DIABETES MELLITUS WITH HYPOGLYCEMIA WITHOUT COMA, WITH LONG-TERM CURRENT USE OF INSULIN (H): ICD-10-CM

## 2018-01-01 DIAGNOSIS — H91.21 SUDDEN RIGHT HEARING LOSS: Primary | ICD-10-CM

## 2018-01-01 DIAGNOSIS — H90.A31 MIXED CONDUCTIVE AND SENSORINEURAL HEARING LOSS OF RIGHT EAR WITH RESTRICTED HEARING OF LEFT EAR: ICD-10-CM

## 2018-01-01 DIAGNOSIS — Z79.4 TYPE 2 DIABETES MELLITUS WITHOUT COMPLICATION, WITH LONG-TERM CURRENT USE OF INSULIN (H): Primary | ICD-10-CM

## 2018-01-01 DIAGNOSIS — E11.9 TYPE 2 DIABETES MELLITUS WITHOUT COMPLICATION, UNSPECIFIED LONG TERM INSULIN USE STATUS: ICD-10-CM

## 2018-01-01 DIAGNOSIS — E11.649 TYPE 2 DIABETES MELLITUS WITH HYPOGLYCEMIA WITHOUT COMA, WITH LONG-TERM CURRENT USE OF INSULIN (H): ICD-10-CM

## 2018-01-01 DIAGNOSIS — H90.3 ASYMMETRICAL SENSORINEURAL HEARING LOSS: Primary | ICD-10-CM

## 2018-01-01 DIAGNOSIS — I10 HYPERTENSION GOAL BP (BLOOD PRESSURE) < 140/90: ICD-10-CM

## 2018-01-01 DIAGNOSIS — H91.20 SUDDEN-ONSET SENSORINEURAL HEARING LOSS: Primary | ICD-10-CM

## 2018-01-01 DIAGNOSIS — H69.93 DISORDER OF BOTH EUSTACHIAN TUBES: ICD-10-CM

## 2018-01-01 DIAGNOSIS — E11.9 TYPE 2 DIABETES MELLITUS WITHOUT COMPLICATION, WITH LONG-TERM CURRENT USE OF INSULIN (H): Primary | ICD-10-CM

## 2018-01-01 DIAGNOSIS — H90.3 SENSORINEURAL HEARING LOSS, ASYMMETRICAL: Primary | ICD-10-CM

## 2018-01-01 DIAGNOSIS — H90.71 MIXED CONDUCTIVE AND SENSORINEURAL HEARING LOSS OF RIGHT EAR, UNSPECIFIED HEARING STATUS ON CONTRALATERAL SIDE: ICD-10-CM

## 2018-01-01 DIAGNOSIS — E66.01 MORBID OBESITY, UNSPECIFIED OBESITY TYPE (H): ICD-10-CM

## 2018-01-01 DIAGNOSIS — E11.9 TYPE 2 DIABETES MELLITUS WITHOUT COMPLICATION, UNSPECIFIED LONG TERM INSULIN USE STATUS: Primary | ICD-10-CM

## 2018-01-01 DIAGNOSIS — H91.21 SUDDEN HEARING LOSS, RIGHT: Primary | ICD-10-CM

## 2018-01-01 DIAGNOSIS — H90.8 MIXED HEARING LOSS: Primary | ICD-10-CM

## 2018-01-01 LAB
ANION GAP SERPL CALCULATED.3IONS-SCNC: 9 MMOL/L (ref 3–14)
BUN SERPL-MCNC: 45 MG/DL (ref 7–30)
CALCIUM SERPL-MCNC: 8.9 MG/DL (ref 8.5–10.1)
CHLORIDE SERPL-SCNC: 97 MMOL/L (ref 94–109)
CO2 SERPL-SCNC: 35 MMOL/L (ref 20–32)
CREAT SERPL-MCNC: 1.09 MG/DL (ref 0.66–1.25)
GFR SERPL CREATININE-BSD FRML MDRD: 65 ML/MIN/1.7M2
GLUCOSE SERPL-MCNC: 132 MG/DL (ref 70–99)
HBA1C MFR BLD: 5.6 % (ref 4.3–6)
POTASSIUM SERPL-SCNC: 3.2 MMOL/L (ref 3.4–5.3)
SODIUM SERPL-SCNC: 141 MMOL/L (ref 133–144)

## 2018-01-01 PROCEDURE — 99207 ZZC NO CHARGE LOS: CPT | Performed by: AUDIOLOGIST

## 2018-01-01 PROCEDURE — 92550 TYMPANOMETRY & REFLEX THRESH: CPT | Mod: GW | Performed by: AUDIOLOGIST

## 2018-01-01 PROCEDURE — 99203 OFFICE O/P NEW LOW 30 MIN: CPT | Mod: GW | Performed by: OTOLARYNGOLOGY

## 2018-01-01 PROCEDURE — 99207 ZZC DROP WITH A PROCEDURE: CPT | Mod: 25 | Performed by: OTOLARYNGOLOGY

## 2018-01-01 PROCEDURE — 99214 OFFICE O/P EST MOD 30 MIN: CPT | Mod: GW | Performed by: INTERNAL MEDICINE

## 2018-01-01 PROCEDURE — 36415 COLL VENOUS BLD VENIPUNCTURE: CPT | Performed by: INTERNAL MEDICINE

## 2018-01-01 PROCEDURE — 92557 COMPREHENSIVE HEARING TEST: CPT | Mod: GW | Performed by: AUDIOLOGIST

## 2018-01-01 PROCEDURE — 99213 OFFICE O/P EST LOW 20 MIN: CPT | Mod: GW | Performed by: OTOLARYNGOLOGY

## 2018-01-01 PROCEDURE — 69801 INCISE INNER EAR: CPT | Mod: RT | Performed by: OTOLARYNGOLOGY

## 2018-01-01 PROCEDURE — 92557 COMPREHENSIVE HEARING TEST: CPT | Mod: 52 | Performed by: AUDIOLOGIST

## 2018-01-01 PROCEDURE — 83036 HEMOGLOBIN GLYCOSYLATED A1C: CPT | Mod: GW | Performed by: INTERNAL MEDICINE

## 2018-01-01 PROCEDURE — 92567 TYMPANOMETRY: CPT | Mod: GW | Performed by: AUDIOLOGIST

## 2018-01-01 PROCEDURE — 80048 BASIC METABOLIC PNL TOTAL CA: CPT | Mod: GW | Performed by: INTERNAL MEDICINE

## 2018-01-01 RX ORDER — ALLOPURINOL 100 MG/1
TABLET ORAL
Qty: 90 TABLET | Refills: 2 | Status: SHIPPED | OUTPATIENT
Start: 2018-01-01

## 2018-01-01 RX ORDER — INSULIN GLARGINE 100 [IU]/ML
25 INJECTION, SOLUTION SUBCUTANEOUS 2 TIMES DAILY
Qty: 45 ML | Refills: 1 | Status: SHIPPED | OUTPATIENT
Start: 2018-01-01

## 2018-01-01 RX ORDER — METOLAZONE 5 MG/1
5 TABLET ORAL
COMMUNITY

## 2018-01-01 RX ORDER — ALLOPURINOL 100 MG/1
TABLET ORAL
Qty: 90 TABLET | Refills: 2 | OUTPATIENT
Start: 2018-01-01

## 2018-01-01 RX ORDER — POTASSIUM CHLORIDE 750 MG/1
10 CAPSULE, EXTENDED RELEASE ORAL 2 TIMES DAILY
COMMUNITY

## 2018-01-01 ASSESSMENT — PAIN SCALES - GENERAL: PAINLEVEL: NO PAIN (0)

## 2018-01-16 NOTE — TELEPHONE ENCOUNTER
Reason for Call:  Medication or medication refill:    Do you use a [facility]?  Name of the pharmacy and phone number for the current request:  [facility]    Name of the medication requested: insulin glargine (LANTUS SOLOSTAR) Needs to be changed to something else. His insurance is not longer covering this. He cannot afford to pay full price    Other request:     Can we leave a detailed message on this number? YES but only on the house number. Cell voicemail does not work.     Phone number patient can be reached at: Cell number on file:    Telephone Information:   Mobile [phone]   Or call his home [phone]    Best Time: any    Call taken on 1/16/2018 at 10:35 AM by Elizabeth Kelly

## 2018-01-16 NOTE — TELEPHONE ENCOUNTER
He can do basaglar insulin 25 units bid and I sent it to [facility], this replaces the lantus.

## 2018-02-07 NOTE — TELEPHONE ENCOUNTER
"Requested Prescriptions   Pending Prescriptions Disp Refills     allopurinol (ZYLOPRIM) 100 MG tablet [Pharmacy Med Name: ALLOPURINOL 100MG TABS] 90 tablet 2     Sig: TAKE ONE TABLET BY MOUTH EVERY DAY    Gout Agents Protocol Failed    2/7/2018  4:08 PM       Failed - Uric acid greater than or equal to 6 on file in past 12 months    Recent Labs   Lab Test  06/10/15   1024   URIC  5.4     If level is 6mg/dL or greater, ok to refill one time and refer to provider.          Passed - CBC on file in past 12 months    Recent Labs   Lab Test  11/27/17   1416   WBC  6.9   RBC  5.01   HGB  15.8   HCT  49.4   PLT  169            Passed - ALT on file in past 12 months    Recent Labs   Lab Test  11/27/17   1416   ALT  16            Passed - Recent or future visit with authorizing provider's specialty    Patient had office visit in the last year or has a visit in the next 30 days with authorizing provider.  See \"Patient Info\" tab in inbasket, or \"Choose Columns\" in Meds & Orders section of the refill encounter.            Passed - Patient is age 18 or older       Passed - Normal serum creatinine on file in the past 12 months    Recent Labs   Lab Test  12/14/17   1554   CR  1.07               Last Written Prescription Date:  4/14/17  Last Fill Quantity: 90,  # refills: 2   Last Office Visit with G, P or [facility] prescribing provider:  2/21/17   Future Office Visit:       "

## 2018-02-08 NOTE — PROGRESS NOTES
Outpatient Occupational Therapy Discharge Note     Patient: Geo Martinez  : 1940    Beginning/End Dates of Reporting Period:  17 to 17  Patient seen for 2 OT treatment sessions since SOC    Referring Provider: Dr Bob Khan    Therapy Diagnosis: Pre-driving assessment and safety order, decreased cognitive processing and reaction speeds.     Client Self Report:   The patient has not contacted rehab for further OT appointments.  OT to discharge at this time.    Objective Measurements:  Patient not available for discharge assessment.  Please refer to OT eval and processing notes for function level findings.    Goals:   Patient not available for findings and details toward goals    Plan:  Discharge from therapy.    Reason for Discharge: Patient chooses to discontinue therapy.  Patient has failed to schedule further appointments.  Medicare G-code: Patient did not attend a final scheduled session prior to discharge. Unable to determine discharge functional status.    Discharge Plan: Patient to continue home program.      Thank you for referring Geo To OT services to assist with pre-driving skills and safety.    If you have any questions or concerns, please contact me at [phone].    Kami Mcintyre MA, OTR/L  [facility]

## 2018-02-08 NOTE — ADDENDUM NOTE
Encounter addended by: Kami Mcintyre OT on: 2/8/2018  7:39 AM<BR>     Actions taken: Episode resolved, Sign clinical note

## 2018-02-09 NOTE — TELEPHONE ENCOUNTER
"Routing refill request to provider for review/approval because:  Labs not current:  Last uric acid was on 2015    Kasey Jung, VERA              Requested Prescriptions   Pending Prescriptions Disp Refills     allopurinol (ZYLOPRIM) 100 MG tablet [Pharmacy Med Name: ALLOPURINOL 100MG TABS] 90 tablet 2     Sig: TAKE ONE TABLET BY MOUTH EVERY DAY    Gout Agents Protocol Failed    2/7/2018  4:18 PM       Failed - Uric acid greater than or equal to 6 on file in past 12 months    Recent Labs   Lab Test  06/10/15   1024   URIC  5.4     If level is 6mg/dL or greater, ok to refill one time and refer to provider.          Passed - CBC on file in past 12 months    Recent Labs   Lab Test  11/27/17   1416   WBC  6.9   RBC  5.01   HGB  15.8   HCT  49.4   PLT  169            Passed - ALT on file in past 12 months    Recent Labs   Lab Test  11/27/17   1416   ALT  16            Passed - Recent or future visit with authorizing provider's specialty    Patient had office visit in the last year or has a visit in the next 30 days with authorizing provider.  See \"Patient Info\" tab in inbasket, or \"Choose Columns\" in Meds & Orders section of the refill encounter.            Passed - Patient is age 18 or older       Passed - Normal serum creatinine on file in the past 12 months    Recent Labs   Lab Test  12/14/17   1554   CR  1.07               "

## 2018-02-14 NOTE — TELEPHONE ENCOUNTER
"Last Written Prescription Date:  2/09/2018  Last Fill Quantity: 90,  # refills: 2   Last office visit: 11/27/2017 with prescribing provider:  Dr. Khan   Future Office Visit:    Requested Prescriptions   Pending Prescriptions Disp Refills     allopurinol (ZYLOPRIM) 100 MG tablet [Pharmacy Med Name: ALLOPURINOL 100MG TABS] 90 tablet 2     Sig: TAKE ONE TABLET BY MOUTH EVERY DAY    Gout Agents Protocol Failed    2/14/2018  4:28 PM       Failed - Uric acid greater than or equal to 6 on file in past 12 months    Recent Labs   Lab Test  06/10/15   1024   URIC  5.4     If level is 6mg/dL or greater, ok to refill one time and refer to provider.          Passed - CBC on file in past 12 months    Recent Labs   Lab Test  11/27/17   1416   WBC  6.9   RBC  5.01   HGB  15.8   HCT  49.4   PLT  169            Passed - ALT on file in past 12 months    Recent Labs   Lab Test  11/27/17   1416   ALT  16            Passed - Recent or future visit with authorizing provider's specialty    Patient had office visit in the last year or has a visit in the next 30 days with authorizing provider.  See \"Patient Info\" tab in inbasket, or \"Choose Columns\" in Meds & Orders section of the refill encounter.            Passed - Patient is age 18 or older       Passed - Normal serum creatinine on file in the past 12 months    Recent Labs   Lab Test  12/14/17   1554   CR  1.07               "

## 2018-02-22 NOTE — TELEPHONE ENCOUNTER
"Requested Prescriptions   Pending Prescriptions Disp Refills     blood glucose monitoring (ACCU-CHEK MULTICLIX) lancets [Pharmacy Med Name: ACCU-CHEK MULTICLIX LANCET] 306 each 4     Sig: TEST BLOOD SUGARS THREE TIMES DAILY OR AS DIRECTED    Diabetic Supplies Protocol Passed    2/22/2018  3:12 PM       Passed - Patient is 18 years of age or older       Passed - Patient has had appt within past 6 mos    Patient had office visit in the last 6 months or has a visit in the next 30 days with authorizing provider.  See \"Patient Info\" tab in inbasket, or \"Choose Columns\" in Meds & Orders section of the refill encounter.              Last Written Prescription Date:  3/12/13  Last Fill Quantity: 102,  # refills: PRN   Last Office Visit with G, P or [facility] prescribing provider:  11/27/17   Future Office Visit:       "

## 2018-02-23 NOTE — TELEPHONE ENCOUNTER
Lancets  Routing refill request to provider for review/approval because:  A break in medication    Doug Graham RN, BSN

## 2018-03-12 NOTE — LETTER
3/12/2018         RE: Geo Martinez  [address] [place] [id number]        Dear Colleague,    Thank you for referring your patient, Geo Martinez, to the [facility]. Please see a copy of my visit note below.    ENT Consultation    Geo Martinez is a 77 year old male who is seen in consultation at the request of Dr.Paul Khan.      History of Present Illness - Geo Martinez is a 77 year old male sudden hearing loss. Patient reports that he had eye injections for macular degeneration on 03/07/18, the next morning he woke with his hearing decreased. He has had a slow decrease of hearing in the left ear for many years.       Past Medical History -   Past Medical History:   Diagnosis Date     Atrial fibrillation (H)      Chest pain 02/03/10    D/C 02/04/10     Chronic airway obstruction, not elsewhere classified     COPD     Congestive heart failure, unspecified 09/17/2007     Diabetic eye exam (H) 08/15/11     Diabetic eye exam (H) 08/03/12     Gout, unspecified      Hyperkalemia 01/10/09    Discharged 01/12/09-[facility]     Chronic low back pain     Mixed hyperlipidemia      Other primary cardiomyopathies     Idiopathic Dilated Cardiomyopathy     Shortness of breath 03/23/09    D/C 03/27/09-[facility]     Type II or unspecified type diabetes mellitus without mention of complication, not stated as uncontrolled      Unspecified essential hypertension        Current Medications -   Current Outpatient Prescriptions:      potassium chloride (MICRO-K) 10 MEQ CR capsule, Take 10 mEq by mouth 2 times daily, Disp: , Rfl:      metolazone (ZAROXOLYN) 5 MG tablet, Take 5 mg by mouth M,W,F, Disp: , Rfl:      blood glucose monitoring (ACCU-CHEK MULTICLIX) lancets, TEST BLOOD SUGARS THREE TIMES DAILY OR AS DIRECTED, Disp: 306 each, Rfl: 4     allopurinol (ZYLOPRIM) 100 MG tablet, TAKE ONE TABLET BY MOUTH EVERY DAY, Disp: 90 tablet, Rfl: 2      ULTICARE SHORT 31G X 8 MM insulin pen needle, USE ONE FOUR TIMES A DAY (TWO WITH LANTUS AND TWO WITH BYETTA), Disp: 300 each, Rfl: 3     BASAGLAR 100 UNIT/ML injection, Inject 25 Units Subcutaneous 2 times daily, Disp: 45 mL, Rfl: 1     pravastatin (PRAVACHOL) 80 MG tablet, Take 1 tablet (80 mg) by mouth daily, Disp: 90 tablet, Rfl: 3     ACCU-CHEK JOHN PLUS test strip, TEST FIVE TIMES DAILY (MD GUERRA), Disp: 5 Box, Rfl: 3     methadone (DOLOPHINE) 5 MG tablet, Take 10 mg by mouth 2 times daily, Disp: , Rfl:      ipratropium - albuterol 0.5 mg/2.5 mg/3 mL (DUONEB) 0.5-2.5 (3) MG/3ML neb solution, Take 1 vial by nebulization every 6 hours as needed for shortness of breath / dyspnea or wheezing, Disp: , Rfl:      Multiple Vitamins-Minerals (EYE VITAMINS & MINERALS) TABS, Take 2 tablets by mouth daily (Focus Brand), Disp: , Rfl:      polyethylene glycol 0.4%- propylene glycol 0.3% (SYSTANE) 0.4-0.3 % SOLN ophthalmic solution, Place 1 drop into both eyes 4 times daily as needed for dry eyes, Disp: , Rfl:      polyethylene glycol (MIRALAX/GLYCOLAX) powder, Take 1 capful by mouth daily as needed for constipation, Disp: , Rfl:      guaiFENesin (MUCINEX) 600 MG 12 hr tablet, Take 600 mg by mouth 2 times daily as needed , Disp: , Rfl:      traZODone (DESYREL) 50 MG tablet, Take by mouth At Bedtime 1-2 tabs at bedtime, Disp: , Rfl:      FLUoxetine (PROZAC) 20 MG capsule, Take 1 capsule (20 mg) by mouth daily, Disp: 90 capsule, Rfl: 2     triamcinolone acetonide 0.05 % OINT, Apply Topically two times a day as needed, Disp: 90 g, Rfl: 3     carvedilol (COREG) 6.25 MG tablet, Take 18.75 mg by mouth 2 times daily (with meals) , Disp: 540 tablet, Rfl: 3     torsemide (DEMADEX) 20 MG tablet, 40 mg in AM and 40 mg at noon, Disp: 360 tablet, Rfl: 3     blood glucose monitoring (ACCU-CHEK JOHN PLUS) meter device kit, Use to test blood sugars 5 times daily or as directed., Disp: 1 kit, Rfl: 0     albuterol (PROAIR HFA, PROVENTIL HFA,  "VENTOLIN HFA) 108 (90 BASE) MCG/ACT inhaler, Inhale 2 puffs into the lungs 4 times daily as needed for shortness of breath / dyspnea, Disp: 1 Inhaler, Rfl: 5     Omega-3 Fatty Acids (FISH OIL) 1200 MG CAPS, Take 1 capsule by mouth 2 times daily , Disp: , Rfl:      aspirin 81 MG EC tablet, Take 1 tablet (81 mg) by mouth daily, Disp: 90 tablet, Rfl: 3     ORDER FOR DME, Equipment being ordered: Wheelchair-electric, [employer] mobility device Face to face exam Sept 25th, 2013 Diagnosis COPD(496), CHF (428) Length of need 99 months Weight 303#, Disp: 1 Device, Rfl: 0    Allergies -   Allergies   Allergen Reactions     Metformin Diarrhea     Prednisone      Affected his eyes       Vytorin      rhabdomyolysis       Social History -   Social History     Social History     Marital status:      Spouse name: Karen     Number of children: N/A     Years of education: N/A     Social History Main Topics     Smoking status: Former Smoker     Quit date: 1/1/1986     Smokeless tobacco: Not on file      Comment: quit in 1986 after smoking for 30 years at 1-2 ppd     Alcohol use No     Drug use: No     Sexual activity: No     Other Topics Concern     Parent/Sibling W/ Cabg, Mi Or Angioplasty Before 65f 55m? No     Social History Narrative       Family History -   Family History   Problem Relation Age of Onset     Hypertension Father      Hypertension Paternal Grandfather      C.A.D. Father      C.A.D. Paternal Grandfather      Hypertension Mother      CANCER Daughter      lymphoma       Review of Systems - As per HPI and PMHx, otherwise review of system review of the head and neck negative.    Physical Exam  Pulse 68  Resp 16  Ht 1.702 m (5' 7\")  Wt 107.5 kg (237 lb)  SpO2 93%  BMI 37.12 kg/m2  BMI: Body mass index is 37.12 kg/(m^2).    General - The patient is well nourished and well developed, and appears to have good nutritional status.  Alert and oriented to person and place, answers questions and cooperates with " examination appropriately.    SKIN - No suspicious lesions or rashes.  Respiration - No respiratory distress.  Head and Face - Normocephalic and atraumatic, with no gross asymmetry noted of the contour of the facial features.  The facial nerve is intact, with strong symmetric movements.    Voice and Breathing - The patient was breathing comfortably without the use of accessory muscles. The patients voice was clear and strong, and had appropriate pitch and quality.    Ears - Bilateral pinna and EACs with normal appearing overlying skin. Tympanic membrane intact with good mobility on pneumatic otoscopy bilaterally. Bony landmarks of the ossicular chain are normal. The tympanic membranes are normal in appearance. No retraction, perforation, or masses.  No fluid or purulence was seen in the external canal or the middle ear.     Eyes - Extraocular movements intact.  Sclera were not icteric or injected, conjunctiva were pink and moist.    Mouth - Examination of the oral cavity showed pink, healthy oral mucosa. No lesions or ulcerations noted.  The tongue was mobile and midline, and the dentition were in good condition.      Throat - The walls of the oropharynx were smooth, pink, moist, symmetric, and had no lesions or ulcerations.  The tonsillar pillars and soft palate were symmetric.  The uvula was midline on elevation.    Neck - Normal midline excursion of the laryngotracheal complex during swallowing.  Full range of motion on passive movement.  Palpation of the occipital, submental, submandibular, internal jugular chain, and supraclavicular nodes did not demonstrate any abnormal lymph nodes or masses.  The carotid pulse was palpable bilaterally.  Palpation of the thyroid was soft and smooth, with no nodules or goiter appreciated.  The trachea was mobile and midline.    Nose - External contour is symmetric, no gross deflection or scars.  Nasal mucosa is pink and moist with no abnormal mucus.  The septum was midline and  non-obstructive, turbinates of normal size and position.  No polyps, masses, or purulence noted on examination.    Neuro - Nonfocal neuro exam is normal, CN 2 through 12 intact, normal gait and muscle tone.      A/P - Geo Martinez is a 77 year old male with sudden hearing loss of the right ear. Patient will return to the [facility] in 2 days for 1 out 4 ear profusions.      This document serves as a record of the services and decisions personally performed and made by Dr. Jaspal Ferrari MD. It was created on his behalf by Ashlyn Ledezma, a trained medical scribe. The creation of this document is based the provider's statements to the medical scribe.  Ashlyn Ledezma 10:37 AM 3/12/2018    Provider:   The information in this document, created by the medical scribe for me, accurately reflects the services I personally performed and the decisions made by me. I have reviewed and approved this document for accuracy prior to leaving the patient care area.  Dr. Jaspal Ferrari MD 10:37 AM 3/12/2018    Jaspal Ferrari MD.      Again, thank you for allowing me to participate in the care of your patient.        Sincerely,        Jaspal Ferrari MD, MD

## 2018-03-12 NOTE — MR AVS SNAPSHOT
"              After Visit Summary   3/12/2018    Geo Martinez    MRN: [id number]           Patient Information     Date Of Birth          1940        Visit Information        Provider Department      3/12/2018 9:30 AM Susie Dinh [facility]        Today's Diagnoses     Sensorineural hearing loss, bilateral    -  1       Follow-ups after your visit        Your next 10 appointments already scheduled     Mar 14, 2018 11:30 AM CDT   Return Visit with Jaspal Ferrari MD   [facility]    [address] [place] [address]  [facility] [id number] [phone]              Who to contact     If you have questions or need follow up information about today's clinic visit or your schedule please contact [facility] directly at [phone].  Normal or non-critical lab and imaging results will be communicated to you by MyChart, letter or phone within 4 business days after the clinic has received the results. If you do not hear from us within 7 days, please contact the clinic through MyChart or phone. If you have a critical or abnormal lab result, we will notify you by phone as soon as possible.  Submit refill requests through [employer] or call your pharmacy and they will forward the refill request to us. Please allow 3 business days for your refill to be completed.          Additional Information About Your Visit        MyChart Information     [employer] lets you send messages to your doctor, view your test results, renew your prescriptions, schedule appointments and more. To sign up, go to [email or web address] . Click on \"Log in\" on the left side of the screen, which will take you to the Welcome page. Then click on \"Sign up Now\" on the right side of the page.     You will be asked to enter the access code listed below, as well as some personal information. Please follow the directions to create your username and password.   "   Your access code is: [email or web address]  Expires: 6/10/2018  8:40 AM     Your access code will  in 90 days. If you need help or a new code, please call your [facility] clinic or [phone].        Care EveryWhere ID     This is your Care EveryWhere ID. This could be used by other organizations to access your [facility] medical records  [id number]         Blood Pressure from Last 3 Encounters:   18 122/74   17 118/66   17 108/66    Weight from Last 3 Encounters:   18 237 lb (107.5 kg)   18 237 lb (107.5 kg)   17 242 lb (109.8 kg)              We Performed the Following     AUDIOGRAM/TYMPANOGRAM - INTERFACE     COMPREHENSIVE HEARING TEST     TYMPANOMETRY AND REFLEX THRESHOLD MEASUREMENTS          Today's Medication Changes          These changes are accurate as of 3/12/18  2:24 PM.  If you have any questions, ask your nurse or doctor.               Stop taking these medicines if you haven't already. Please contact your care team if you have questions.     lisinopril 20 MG tablet   Commonly known as:  PRINIVIL/ZESTRIL   Stopped by:  Bob Khan MD           LORazepam 1 MG tablet   Commonly known as:  ATIVAN   Stopped by:  Bob Khan MD           NUTRITIONAL SUPPLEMENT PO   Stopped by:  Bob Khan MD           oxyCODONE IR 5 MG tablet   Commonly known as:  ROXICODONE   Stopped by:  Bob Khan MD           potassium chloride SA 20 MEQ CR tablet   Commonly known as:  KLOR-CON   Stopped by:  Bob Khan MD                    Primary Care Provider Office Phone # Fax #    Bob Khan [phone]       [facility] [address] [place] [address]        Goals        General    I would like a visitor in my home so I am not as lonely/Start Date 10/26/2015 (pt-stated)     Notes - Note created  10/27/2015  1:47 PM by Alexia Daily MSW    As of today's date 10/27/2015 goal is met at 26 - 50%.   Goal Status:  Active  Patient signed  Consent to Communicate for me to make a referral for him to have a Senior  through [facility]. Patient also has a  come into his home weekly, as well as family members and friends either call, or stop in during the week.          Equal Access to Services     APOLINAR MIRANDA : Hadii aad ku hadamelia Jackson, wamorenoda luqadaha, qaybta kaalmada yesenia, anselmo vanessain hayaalexx pierce leighton raúl hanley. So [facility] [phone].    ATENCIÓN: Si habla español, tiene a christian disposición servicios gratuitos de asistencia lingüística. Llame al [phone].    We comply with applicable federal civil rights laws and [place] laws. We do not discriminate on the basis of race, color, national origin, age, disability, sex, sexual orientation, or gender identity.            Thank you!     Thank you for choosing [facility]  for your care. Our goal is always to provide you with excellent care. Hearing back from our patients is one way we can continue to improve our services. Please take a few minutes to complete the written survey that you may receive in the mail after your visit with us. Thank you!             Your Updated Medication List - Protect others around you: Learn how to safely use, store and throw away your medicines at www.disposemymeds.org.          This list is accurate as of 3/12/18  2:24 PM.  Always use your most recent med list.                   Brand Name Dispense Instructions for use Diagnosis    ACCU-CHEK JOHN PLUS test strip   Generic drug:  blood glucose monitoring     5 Box    TEST FIVE TIMES DAILY (MD GUERRA)    Type 2 diabetes mellitus with hypoglycemia without coma (H)       albuterol 108 (90 BASE) MCG/ACT Inhaler    PROAIR HFA/PROVENTIL HFA/VENTOLIN HFA    1 Inhaler    Inhale 2 puffs into the lungs 4 times daily as needed for shortness of breath / dyspnea    Chronic diastolic congestive heart failure (H)       allopurinol 100 MG tablet    ZYLOPRIM    90 tablet     TAKE ONE TABLET BY MOUTH EVERY DAY    Chronic gout without tophus, unspecified cause, unspecified site       aspirin 81 MG EC tablet     90 tablet    Take 1 tablet (81 mg) by mouth daily    Type 2 diabetes, HbA1C goal < 8% (H)       BASAGLAR 100 UNIT/ML injection     45 mL    Inject 25 Units Subcutaneous 2 times daily    Type 2 diabetes mellitus with hypoglycemia without coma, with long-term current use of insulin (H)       blood glucose monitoring lancets     306 each    TEST BLOOD SUGARS THREE TIMES DAILY OR AS DIRECTED    Type 2 diabetes mellitus without complication, unspecified long term insulin use status (H)       blood glucose monitoring meter device kit     1 kit    Use to test blood sugars 5 times daily or as directed.    Type 2 diabetes mellitus without complication (H)       carvedilol 6.25 MG tablet    COREG    540 tablet    Take 18.75 mg by mouth 2 times daily (with meals)    Acute on chronic congestive heart failure, unspecified congestive heart failure type (H)       EYE VITAMINS & MINERALS Tabs      Take 2 tablets by mouth daily (Focus Brand)        fish Oil 1200 MG capsule      Take 1 capsule by mouth 2 times daily        FLUoxetine 20 MG capsule    PROzac    90 capsule    Take 1 capsule (20 mg) by mouth daily    Adjustment disorder with depressed mood       ipratropium - albuterol 0.5 mg/2.5 mg/3 mL 0.5-2.5 (3) MG/3ML neb solution    DUONEB     Take 1 vial by nebulization every 6 hours as needed for shortness of breath / dyspnea or wheezing        methadone 5 MG tablet    DOLOPHINE     Take 10 mg by mouth 2 times daily        metolazone 5 MG tablet    ZAROXOLYN     Take 5 mg by mouth M,W,F        MUCINEX 600 MG 12 hr tablet   Generic drug:  guaiFENesin      Take 600 mg by mouth 2 times daily as needed        order for DME     1 Device    Equipment being ordered: Wheelchair-electric, hover[employer] Power mobility device Face to face exam Sept 25th, 2013 Diagnosis COPD(496), CHF (428) Length of need 99  months Weight 303#    Chronic airway obstruction, not elsewhere classified, Congestive heart failure, unspecified       polyethylene glycol powder    MIRALAX/GLYCOLAX     Take 1 capful by mouth daily as needed for constipation        potassium chloride 10 MEQ CR capsule    MICRO-K     Take 10 mEq by mouth 2 times daily        pravastatin 80 MG tablet    PRAVACHOL    90 tablet    Take 1 tablet (80 mg) by mouth daily    Hyperlipidemia LDL goal <100       SYSTANE 0.4-0.3 % Soln ophthalmic solution   Generic drug:  polyethylene glycol 0.4%- propylene glycol 0.3%      Place 1 drop into both eyes 4 times daily as needed for dry eyes        torsemide 20 MG tablet    DEMADEX    360 tablet    40 mg in AM and 40 mg at noon    Generalized edema       traZODone 50 MG tablet    DESYREL     Take by mouth At Bedtime 1-2 tabs at bedtime        triamcinolone acetonide 0.05 % Oint     90 g    Apply Topically two times a day as needed    Rash       ULTICARE SHORT 31G X 8 MM   Generic drug:  insulin pen needle     300 each    USE ONE FOUR TIMES A DAY (TWO WITH LANTUS AND TWO WITH BYETTA)    Type 2 diabetes, HbA1C goal < 8% (H)

## 2018-03-12 NOTE — MR AVS SNAPSHOT
"              After Visit Summary   3/12/2018    Geo Martinez    MRN: [id number]           Patient Information     Date Of Birth          1940        Visit Information        Provider Department      3/12/2018 10:00 AM Jaspal Ferrari MD [facility]         Follow-ups after your visit        Your next 10 appointments already scheduled     Mar 14, 2018 11:30 AM CDT   Return Visit with Jaspal Ferrari MD   [facility]    [address] [place] [address] [address]  [facility] [id number] [phone]              Who to contact     If you have questions or need follow up information about today's clinic visit or your schedule please contact [facility] directly at [phone].  Normal or non-critical lab and imaging results will be communicated to you by MyChart, letter or phone within 4 business days after the clinic has received the results. If you do not hear from us within 7 days, please contact the clinic through MyChart or phone. If you have a critical or abnormal lab result, we will notify you by phone as soon as possible.  Submit refill requests through "[employer] or call your pharmacy and they will forward the refill request to us. Please allow 3 business days for your refill to be completed.          Additional Information About Your Visit        MyChart Information     "[employer] lets you send messages to your doctor, view your test results, renew your prescriptions, schedule appointments and more. To sign up, go to [email or web address] . Click on \"Log in\" on the left side of the screen, which will take you to the Welcome page. Then click on \"Sign up Now\" on the right side of the page.     You will be asked to enter the access code listed below, as well as some personal information. Please follow the directions to create your username and password.     Your access code is: [email or web address]  Expires: 6/10/2018  8:40 AM     Your access " "code will  in 90 days. If you need help or a new code, please call your [facility] clinic or [phone].        Care EveryWhere ID     This is your Care EveryWhere ID. This could be used by other organizations to access your [facility] medical records  [id number]        Your Vitals Were     Pulse Respirations Height Pulse Oximetry BMI (Body Mass Index)       68 16 1.702 m (5' 7\") 93% 37.12 kg/m2        Blood Pressure from Last 3 Encounters:   18 122/74   17 118/66   17 108/66    Weight from Last 3 Encounters:   18 107.5 kg (237 lb)   18 107.5 kg (237 lb)   17 109.8 kg (242 lb)              Today, you had the following     No orders found for display         Today's Medication Changes          These changes are accurate as of 3/12/18 10:57 AM.  If you have any questions, ask your nurse or doctor.               Stop taking these medicines if you haven't already. Please contact your care team if you have questions.     lisinopril 20 MG tablet   Commonly known as:  PRINIVIL/ZESTRIL   Stopped by:  Bob Khan MD           LORazepam 1 MG tablet   Commonly known as:  ATIVAN   Stopped by:  Bob Khan MD           NUTRITIONAL SUPPLEMENT PO   Stopped by:  Bob Khan MD           oxyCODONE IR 5 MG tablet   Commonly known as:  ROXICODONE   Stopped by:  Bob Khan MD           potassium chloride SA 20 MEQ CR tablet   Commonly known as:  KLOR-CON   Stopped by:  Bob Khan MD                    Primary Care Provider Office Phone # Fax #    Bob Khan [phone]       [facility] [address] [place] [address]        Goals        General    I would like a visitor in my home so I am not as lonely/Start Date 10/26/2015 (pt-stated)     Notes - Note created  10/27/2015  1:47 PM by Alexia Daily MSW    As of today's date 10/27/2015 goal is met at 26 - 50%.   Goal Status:  Active  Patient signed Consent to Communicate for me to " make a referral for him to have a Senior  through [facility]. Patient also has a  come into his home weekly, as well as family members and friends either call, or stop in during the week.          Equal Access to Services     APOLINAR MIRANDA : Hadii aad ku hadamelia Jackson, wamorenoda luqadaha, qaybta kaalmada yesenia, anselmo hortonlexx pierce leighton raúl hanley. So [facility] [phone].    ATENCIÓN: Si habla español, tiene a christian disposición servicios gratuitos de asistencia lingüística. Llame al [phone].    We comply with applicable federal civil rights laws and [place] laws. We do not discriminate on the basis of race, color, national origin, age, disability, sex, sexual orientation, or gender identity.            Thank you!     Thank you for choosing [facility]  for your care. Our goal is always to provide you with excellent care. Hearing back from our patients is one way we can continue to improve our services. Please take a few minutes to complete the written survey that you may receive in the mail after your visit with us. Thank you!             Your Updated Medication List - Protect others around you: Learn how to safely use, store and throw away your medicines at www.disposemymeds.org.          This list is accurate as of 3/12/18 10:57 AM.  Always use your most recent med list.                   Brand Name Dispense Instructions for use Diagnosis    ACCU-CHEK JOHN PLUS test strip   Generic drug:  blood glucose monitoring     5 Box    TEST FIVE TIMES DAILY (MD GUERRA)    Type 2 diabetes mellitus with hypoglycemia without coma (H)       albuterol 108 (90 BASE) MCG/ACT Inhaler    PROAIR HFA/PROVENTIL HFA/VENTOLIN HFA    1 Inhaler    Inhale 2 puffs into the lungs 4 times daily as needed for shortness of breath / dyspnea    Chronic diastolic congestive heart failure (H)       allopurinol 100 MG tablet    ZYLOPRIM    90 tablet    TAKE ONE TABLET BY MOUTH EVERY DAY     Chronic gout without tophus, unspecified cause, unspecified site       aspirin 81 MG EC tablet     90 tablet    Take 1 tablet (81 mg) by mouth daily    Type 2 diabetes, HbA1C goal < 8% (H)       BASAGLAR 100 UNIT/ML injection     45 mL    Inject 25 Units Subcutaneous 2 times daily    Type 2 diabetes mellitus with hypoglycemia without coma, with long-term current use of insulin (H)       blood glucose monitoring lancets     306 each    TEST BLOOD SUGARS THREE TIMES DAILY OR AS DIRECTED    Type 2 diabetes mellitus without complication, unspecified long term insulin use status (H)       blood glucose monitoring meter device kit     1 kit    Use to test blood sugars 5 times daily or as directed.    Type 2 diabetes mellitus without complication (H)       carvedilol 6.25 MG tablet    COREG    540 tablet    Take 18.75 mg by mouth 2 times daily (with meals)    Acute on chronic congestive heart failure, unspecified congestive heart failure type (H)       EYE VITAMINS & MINERALS Tabs      Take 2 tablets by mouth daily (Focus Brand)        fish Oil 1200 MG capsule      Take 1 capsule by mouth 2 times daily        FLUoxetine 20 MG capsule    PROzac    90 capsule    Take 1 capsule (20 mg) by mouth daily    Adjustment disorder with depressed mood       ipratropium - albuterol 0.5 mg/2.5 mg/3 mL 0.5-2.5 (3) MG/3ML neb solution    DUONEB     Take 1 vial by nebulization every 6 hours as needed for shortness of breath / dyspnea or wheezing        methadone 5 MG tablet    DOLOPHINE     Take 10 mg by mouth 2 times daily        metolazone 5 MG tablet    ZAROXOLYN     Take 5 mg by mouth M,W,F        MUCINEX 600 MG 12 hr tablet   Generic drug:  guaiFENesin      Take 600 mg by mouth 2 times daily as needed        order for DME     1 Device    Equipment being ordered: Wheelchair-electric, hover[employer] mobility device Face to face exam Sept 25th, 2013 Diagnosis COPD(496), CHF (428) Length of need 99 months Weight 303#    Chronic airway  obstruction, not elsewhere classified, Congestive heart failure, unspecified       polyethylene glycol powder    MIRALAX/GLYCOLAX     Take 1 capful by mouth daily as needed for constipation        potassium chloride 10 MEQ CR capsule    MICRO-K     Take 10 mEq by mouth 2 times daily        pravastatin 80 MG tablet    PRAVACHOL    90 tablet    Take 1 tablet (80 mg) by mouth daily    Hyperlipidemia LDL goal <100       SYSTANE 0.4-0.3 % Soln ophthalmic solution   Generic drug:  polyethylene glycol 0.4%- propylene glycol 0.3%      Place 1 drop into both eyes 4 times daily as needed for dry eyes        torsemide 20 MG tablet    DEMADEX    360 tablet    40 mg in AM and 40 mg at noon    Generalized edema       traZODone 50 MG tablet    DESYREL     Take by mouth At Bedtime 1-2 tabs at bedtime        triamcinolone acetonide 0.05 % Oint     90 g    Apply Topically two times a day as needed    Rash       ULTICARE SHORT 31G X 8 MM   Generic drug:  insulin pen needle     300 each    USE ONE FOUR TIMES A DAY (TWO WITH LANTUS AND TWO WITH BYETTA)    Type 2 diabetes, HbA1C goal < 8% (H)

## 2018-03-12 NOTE — PROGRESS NOTES
ENT Consultation    Geo Martinez is a 77 year old male who is seen in consultation at the request of Dr.Paul Khan.      History of Present Illness - Geo Martinez is a 77 year old male sudden hearing loss. Patient reports that he had eye injections for macular degeneration on 03/07/18, the next morning he woke with his hearing decreased. He has had a slow decrease of hearing in the left ear for many years.       Past Medical History -   Past Medical History:   Diagnosis Date     Atrial fibrillation (H)      Chest pain 02/03/10    D/C 02/04/10     Chronic airway obstruction, not elsewhere classified     COPD     Congestive heart failure, unspecified 09/17/2007     Diabetic eye exam (H) 08/15/11     Diabetic eye exam (H) 08/03/12     Gout, unspecified      Hyperkalemia 01/10/09    Discharged 01/12/09-[facility]     Chronic low back pain     Mixed hyperlipidemia      Other primary cardiomyopathies     Idiopathic Dilated Cardiomyopathy     Shortness of breath 03/23/09    D/C 03/27/09-[facility]     Type II or unspecified type diabetes mellitus without mention of complication, not stated as uncontrolled      Unspecified essential hypertension        Current Medications -   Current Outpatient Prescriptions:      potassium chloride (MICRO-K) 10 MEQ CR capsule, Take 10 mEq by mouth 2 times daily, Disp: , Rfl:      metolazone (ZAROXOLYN) 5 MG tablet, Take 5 mg by mouth M,W,F, Disp: , Rfl:      blood glucose monitoring (ACCU-CHEK MULTICLIX) lancets, TEST BLOOD SUGARS THREE TIMES DAILY OR AS DIRECTED, Disp: 306 each, Rfl: 4     allopurinol (ZYLOPRIM) 100 MG tablet, TAKE ONE TABLET BY MOUTH EVERY DAY, Disp: 90 tablet, Rfl: 2     ULTICARE SHORT 31G X 8 MM insulin pen needle, USE ONE FOUR TIMES A DAY (TWO WITH LANTUS AND TWO WITH BYETTA), Disp: 300 each, Rfl: 3     BASAGLAR 100 UNIT/ML injection, Inject 25 Units Subcutaneous 2 times daily, Disp: 45 mL, Rfl: 1     pravastatin  (PRAVACHOL) 80 MG tablet, Take 1 tablet (80 mg) by mouth daily, Disp: 90 tablet, Rfl: 3     ACCU-CHEK JOHN PLUS test strip, TEST FIVE TIMES DAILY (MD GUERRA), Disp: 5 Box, Rfl: 3     methadone (DOLOPHINE) 5 MG tablet, Take 10 mg by mouth 2 times daily, Disp: , Rfl:      ipratropium - albuterol 0.5 mg/2.5 mg/3 mL (DUONEB) 0.5-2.5 (3) MG/3ML neb solution, Take 1 vial by nebulization every 6 hours as needed for shortness of breath / dyspnea or wheezing, Disp: , Rfl:      Multiple Vitamins-Minerals (EYE VITAMINS & MINERALS) TABS, Take 2 tablets by mouth daily (Focus Brand), Disp: , Rfl:      polyethylene glycol 0.4%- propylene glycol 0.3% (SYSTANE) 0.4-0.3 % SOLN ophthalmic solution, Place 1 drop into both eyes 4 times daily as needed for dry eyes, Disp: , Rfl:      polyethylene glycol (MIRALAX/GLYCOLAX) powder, Take 1 capful by mouth daily as needed for constipation, Disp: , Rfl:      guaiFENesin (MUCINEX) 600 MG 12 hr tablet, Take 600 mg by mouth 2 times daily as needed , Disp: , Rfl:      traZODone (DESYREL) 50 MG tablet, Take by mouth At Bedtime 1-2 tabs at bedtime, Disp: , Rfl:      FLUoxetine (PROZAC) 20 MG capsule, Take 1 capsule (20 mg) by mouth daily, Disp: 90 capsule, Rfl: 2     triamcinolone acetonide 0.05 % OINT, Apply Topically two times a day as needed, Disp: 90 g, Rfl: 3     carvedilol (COREG) 6.25 MG tablet, Take 18.75 mg by mouth 2 times daily (with meals) , Disp: 540 tablet, Rfl: 3     torsemide (DEMADEX) 20 MG tablet, 40 mg in AM and 40 mg at noon, Disp: 360 tablet, Rfl: 3     blood glucose monitoring (ACCU-CHEK JOHN PLUS) meter device kit, Use to test blood sugars 5 times daily or as directed., Disp: 1 kit, Rfl: 0     albuterol (PROAIR HFA, PROVENTIL HFA, VENTOLIN HFA) 108 (90 BASE) MCG/ACT inhaler, Inhale 2 puffs into the lungs 4 times daily as needed for shortness of breath / dyspnea, Disp: 1 Inhaler, Rfl: 5     Omega-3 Fatty Acids (FISH OIL) 1200 MG CAPS, Take 1 capsule by mouth 2 times daily ,  "Disp: , Rfl:      aspirin 81 MG EC tablet, Take 1 tablet (81 mg) by mouth daily, Disp: 90 tablet, Rfl: 3     ORDER FOR DME, Equipment being ordered: Wheelchair-electric, hoverFly [employer] mobility device Face to face exam Sept 25th, 2013 Diagnosis COPD(496), CHF (428) Length of need 99 months Weight 303#, Disp: 1 Device, Rfl: 0    Allergies -   Allergies   Allergen Reactions     Metformin Diarrhea     Prednisone      Affected his eyes       Vytorin      rhabdomyolysis       Social History -   Social History     Social History     Marital status:      Spouse name: Karen     Number of children: N/A     Years of education: N/A     Social History Main Topics     Smoking status: Former Smoker     Quit date: 1/1/1986     Smokeless tobacco: Not on file      Comment: quit in 1986 after smoking for 30 years at 1-2 ppd     Alcohol use No     Drug use: No     Sexual activity: No     Other Topics Concern     Parent/Sibling W/ Cabg, Mi Or Angioplasty Before 65f 55m? No     Social History Narrative       Family History -   Family History   Problem Relation Age of Onset     Hypertension Father      Hypertension Paternal Grandfather      C.A.D. Father      C.A.D. Paternal Grandfather      Hypertension Mother      CANCER Daughter      lymphoma       Review of Systems - As per HPI and PMHx, otherwise review of system review of the head and neck negative.    Physical Exam  Pulse 68  Resp 16  Ht 1.702 m (5' 7\")  Wt 107.5 kg (237 lb)  SpO2 93%  BMI 37.12 kg/m2  BMI: Body mass index is 37.12 kg/(m^2).    General - The patient is well nourished and well developed, and appears to have good nutritional status.  Alert and oriented to person and place, answers questions and cooperates with examination appropriately.    SKIN - No suspicious lesions or rashes.  Respiration - No respiratory distress.  Head and Face - Normocephalic and atraumatic, with no gross asymmetry noted of the contour of the facial features.  The facial nerve is " intact, with strong symmetric movements.    Voice and Breathing - The patient was breathing comfortably without the use of accessory muscles. The patients voice was clear and strong, and had appropriate pitch and quality.    Ears - Bilateral pinna and EACs with normal appearing overlying skin. Tympanic membrane intact with good mobility on pneumatic otoscopy bilaterally. Bony landmarks of the ossicular chain are normal. The tympanic membranes are normal in appearance. No retraction, perforation, or masses.  No fluid or purulence was seen in the external canal or the middle ear.     Eyes - Extraocular movements intact.  Sclera were not icteric or injected, conjunctiva were pink and moist.    Mouth - Examination of the oral cavity showed pink, healthy oral mucosa. No lesions or ulcerations noted.  The tongue was mobile and midline, and the dentition were in good condition.      Throat - The walls of the oropharynx were smooth, pink, moist, symmetric, and had no lesions or ulcerations.  The tonsillar pillars and soft palate were symmetric.  The uvula was midline on elevation.    Neck - Normal midline excursion of the laryngotracheal complex during swallowing.  Full range of motion on passive movement.  Palpation of the occipital, submental, submandibular, internal jugular chain, and supraclavicular nodes did not demonstrate any abnormal lymph nodes or masses.  The carotid pulse was palpable bilaterally.  Palpation of the thyroid was soft and smooth, with no nodules or goiter appreciated.  The trachea was mobile and midline.    Nose - External contour is symmetric, no gross deflection or scars.  Nasal mucosa is pink and moist with no abnormal mucus.  The septum was midline and non-obstructive, turbinates of normal size and position.  No polyps, masses, or purulence noted on examination.    Neuro - Nonfocal neuro exam is normal, CN 2 through 12 intact, normal gait and muscle tone.      A/P - Geo Martinez is a 77  year old male with sudden hearing loss of the right ear. Patient will return to the [facility] in 2 days for 1 out 4 ear profusions.      This document serves as a record of the services and decisions personally performed and made by Dr. Jaspal Ferrari MD. It was created on his behalf by Ashlyn Ledezma, a trained medical scribe. The creation of this document is based the provider's statements to the medical scribe.  Ashlyn Ledezma 10:37 AM 3/12/2018    Provider:   The information in this document, created by the medical scribe for me, accurately reflects the services I personally performed and the decisions made by me. I have reviewed and approved this document for accuracy prior to leaving the patient care area.  Dr. Jaspal Ferrari MD 10:37 AM 3/12/2018    Jaspal Ferrari MD.

## 2018-03-12 NOTE — NURSING NOTE
"Chief Complaint   Patient presents with     other     discuss driving ability     Sudden Hearing Loss       Initial /74  Pulse 68  Temp 97.7  F (36.5  C) (Temporal)  Resp 16  Ht 5' 7\" (1.702 m)  Wt 237 lb (107.5 kg)  SpO2 93%  BMI 37.12 kg/m2 Estimated body mass index is 37.12 kg/(m^2) as calculated from the following:    Height as of this encounter: 5' 7\" (1.702 m).    Weight as of this encounter: 237 lb (107.5 kg).  Medication Reconciliation: complete    "

## 2018-03-12 NOTE — LETTER
[address]   [address]  Tel. [phone] / Fax [phone]    March 12, 2018        Geo Martinez  [address] [place] [id number]          Dear Geo,    HIs diabetes is good, actually too low. Potassium is still low should increase his potassium supplement from 2 a day to 3 a day.     If you have any further question please contact the clinic.     Sincerely,        Bob Khan MD

## 2018-03-12 NOTE — PROGRESS NOTES
AUDIOLOGY REPORT     SUMMARY: Audiology visit completed. See audiogram for results.     RECOMMENDATIONS: Follow-up with ENT    Wesley Goldsmith Licensed Audiologist #[id number]

## 2018-03-12 NOTE — MR AVS SNAPSHOT
"              After Visit Summary   3/12/2018    Geo Martinez    MRN: [id number]           Patient Information     Date Of Birth          1940        Visit Information        Provider Department      3/12/2018 8:15 AM Bob Khan MD [facility]         Follow-ups after your visit        Who to contact     If you have questions or need follow up information about today's clinic visit or your schedule please contact [facility] directly at [phone].  Normal or non-critical lab and imaging results will be communicated to you by [employer], letter or phone within 4 business days after the clinic has received the results. If you do not hear from us within 7 days, please contact the clinic through [employer] or phone. If you have a critical or abnormal lab result, we will notify you by phone as soon as possible.  Submit refill requests through [employer] or call your pharmacy and they will forward the refill request to us. Please allow 3 business days for your refill to be completed.          Additional Information About Your Visit        [employer] Information     [employer] lets you send messages to your doctor, view your test results, renew your prescriptions, schedule appointments and more. To sign up, go to [email or web address] . Click on \"Log in\" on the left side of the screen, which will take you to the Welcome page. Then click on \"Sign up Now\" on the right side of the page.     You will be asked to enter the access code listed below, as well as some personal information. Please follow the directions to create your username and password.     Your access code is: [email or web address]  Expires: 6/10/2018  8:40 AM     Your access code will  in 90 days. If you need help or a new code, please call your [facility] or [phone].        Care EveryWhere ID     This is your Care EveryWhere ID. This could be used by other organizations to access your [facility] medical " "records  [id number]        Your Vitals Were     Pulse Temperature Respirations Height Pulse Oximetry BMI (Body Mass Index)    68 97.7  F (36.5  C) (Temporal) 16 5' 7\" (1.702 m) 93% 37.12 kg/m2       Blood Pressure from Last 3 Encounters:   03/12/18 122/74   11/27/17 118/66   02/21/17 108/66    Weight from Last 3 Encounters:   03/12/18 237 lb (107.5 kg)   11/27/17 242 lb (109.8 kg)   02/21/17 229 lb (103.9 kg)              Today, you had the following     No orders found for display       Primary Care Provider Office Phone # Fax #    Bob Khan [phone]       [facility] [address] [place] [address]        Goals        General    I would like a visitor in my home so I am not as lonely/Start Date 10/26/2015 (pt-stated)     Notes - Note created  10/27/2015  1:47 PM by Alexia Daily MSW    As of today's date 10/27/2015 goal is met at 26 - 50%.   Goal Status:  Active  Patient signed Consent to Communicate for me to make a referral for him to have a Senior  through [facility]. Patient also has a  come into his home weekly, as well as family members and friends either call, or stop in during the week.          Equal Access to Services     [facility] : Hadii joelle Jackson, waaxda luqadaha, qaybta kaalanselmo ibrahim . So [facility] [phone].    ATENCIÓN: Si habla español, tiene a christian disposición servicios gratuitos de asistencia lingüística. Llame al [phone].    We comply with applicable federal civil rights laws and [place] laws. We do not discriminate on the basis of race, color, national origin, age, disability, sex, sexual orientation, or gender identity.            Thank you!     Thank you for choosing [facility]  for your care. Our goal is always to provide you with excellent care. Hearing back from our patients is one way we can continue to improve our " services. Please take a few minutes to complete the written survey that you may receive in the mail after your visit with us. Thank you!             Your Updated Medication List - Protect others around you: Learn how to safely use, store and throw away your medicines at www.disposemymeds.org.          This list is accurate as of 3/12/18  8:40 AM.  Always use your most recent med list.                   Brand Name Dispense Instructions for use Diagnosis    ACCU-CHEK JOHN PLUS test strip   Generic drug:  blood glucose monitoring     5 Box    TEST FIVE TIMES DAILY (MD GUERRA)    Type 2 diabetes mellitus with hypoglycemia without coma (H)       albuterol 108 (90 BASE) MCG/ACT Inhaler    PROAIR HFA/PROVENTIL HFA/VENTOLIN HFA    1 Inhaler    Inhale 2 puffs into the lungs 4 times daily as needed for shortness of breath / dyspnea    Chronic diastolic congestive heart failure (H)       allopurinol 100 MG tablet    ZYLOPRIM    90 tablet    TAKE ONE TABLET BY MOUTH EVERY DAY    Chronic gout without tophus, unspecified cause, unspecified site       aspirin 81 MG EC tablet     90 tablet    Take 1 tablet (81 mg) by mouth daily    Type 2 diabetes, HbA1C goal < 8% (H)       BASAGLAR 100 UNIT/ML injection     45 mL    Inject 25 Units Subcutaneous 2 times daily    Type 2 diabetes mellitus with hypoglycemia without coma, with long-term current use of insulin (H)       blood glucose monitoring lancets     306 each    TEST BLOOD SUGARS THREE TIMES DAILY OR AS DIRECTED    Type 2 diabetes mellitus without complication, unspecified long term insulin use status (H)       blood glucose monitoring meter device kit     1 kit    Use to test blood sugars 5 times daily or as directed.    Type 2 diabetes mellitus without complication (H)       BYETTA 10 MCG PEN 10 MCG/0.04ML injection   Generic drug:  exenatide     7.2 mL    INJECT 10MCG SUBCUTANEOUSLY TWO TIMES A DAY    Type 2 diabetes mellitus with hypoglycemia without coma, unspecified long term  insulin use status (H)       carvedilol 6.25 MG tablet    COREG    540 tablet    Take 18.75 mg by mouth 2 times daily (with meals)    Acute on chronic congestive heart failure, unspecified congestive heart failure type (H)       EYE VITAMINS & MINERALS Tabs      Take 2 tablets by mouth daily (Focus Brand)        ferrous sulfate 325 (65 FE) MG tablet    IRON     Take 325 mg by mouth daily (with breakfast)        fish Oil 1200 MG capsule      Take 1 capsule by mouth 2 times daily        FLUoxetine 20 MG capsule    PROzac    90 capsule    Take 1 capsule (20 mg) by mouth daily    Adjustment disorder with depressed mood       ipratropium - albuterol 0.5 mg/2.5 mg/3 mL 0.5-2.5 (3) MG/3ML neb solution    DUONEB     Take 1 vial by nebulization every 6 hours as needed for shortness of breath / dyspnea or wheezing        lisinopril 20 MG tablet    PRINIVIL/ZESTRIL    90 tablet    Take 1 tablet (20 mg) by mouth daily    Acute on chronic congestive heart failure, unspecified congestive heart failure type (H)       LORazepam 1 MG tablet    ATIVAN     Take 1 mg by mouth At Bedtime        methadone 5 MG tablet    DOLOPHINE     Take 10 mg by mouth 2 times daily        MUCINEX 600 MG 12 hr tablet   Generic drug:  guaiFENesin      Take 600 mg by mouth 2 times daily as needed        NUTRITIONAL SUPPLEMENT PO      Take 1 tablet by mouth daily (Resetigen-D)        order for DME     1 Device    Equipment being ordered: Wheelchair-electric, hover[employer] Power mobility device Face to face exam Sept 25th, 2013 Diagnosis COPD(496), CHF (428) Length of need 99 months Weight 303#    Chronic airway obstruction, not elsewhere classified, Congestive heart failure, unspecified       oxyCODONE IR 5 MG tablet    ROXICODONE    180 tablet    Take 2 tablets (10 mg) by mouth every 8 hours as needed for pain    Chronic low back pain, unspecified back pain laterality, with sciatica presence unspecified       polyethylene glycol powder    MIRALAX/GLYCOLAX      Take 1 capful by mouth daily as needed for constipation        potassium chloride SA 20 MEQ CR tablet    KLOR-CON    90 tablet    Take 1 tablet (20 mEq) by mouth daily    Low blood potassium       pravastatin 80 MG tablet    PRAVACHOL    90 tablet    Take 1 tablet (80 mg) by mouth daily    Hyperlipidemia LDL goal <100       SYSTANE 0.4-0.3 % Soln ophthalmic solution   Generic drug:  polyethylene glycol 0.4%- propylene glycol 0.3%      Place 1 drop into both eyes 4 times daily as needed for dry eyes        torsemide 20 MG tablet    DEMADEX    360 tablet    40 mg in AM and 40 mg at noon    Generalized edema       traZODone 50 MG tablet    DESYREL     Take by mouth At Bedtime 1-2 tabs at bedtime        triamcinolone acetonide 0.05 % Oint     90 g    Apply Topically two times a day as needed    Rash       ULTICARE SHORT 31G X 8 MM   Generic drug:  insulin pen needle     300 each    USE ONE FOUR TIMES A DAY (TWO WITH LANTUS AND TWO WITH BYETTA)    Type 2 diabetes, HbA1C goal < 8% (H)
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
## 2018-03-14 NOTE — MR AVS SNAPSHOT
"              After Visit Summary   3/14/2018    Geo Martinez    MRN: [id number]           Patient Information     Date Of Birth          1940        Visit Information        Provider Department      3/14/2018 11:30 AM Jaspal Ferrari MD [facility]        Today's Diagnoses     Sudden right hearing loss    -  1       Follow-ups after your visit        Your next 10 appointments already scheduled     Mar 21, 2018 11:30 AM CDT   Return Visit with Jaspal Ferrari MD   [facility] ([facility]    [address]  [facility] [address] [phone]              Who to contact     If you have questions or need follow up information about today's clinic visit or your schedule please contact [facility] directly at [phone].  Normal or non-critical lab and imaging results will be communicated to you by [employer], letter or phone within 4 business days after the clinic has received the results. If you do not hear from us within 7 days, please contact the clinic through MyChart or phone. If you have a critical or abnormal lab result, we will notify you by phone as soon as possible.  Submit refill requests through [employer] or call your pharmacy and they will forward the refill request to us. Please allow 3 business days for your refill to be completed.          Additional Information About Your Visit        MyChart Information     [employer] lets you send messages to your doctor, view your test results, renew your prescriptions, schedule appointments and more. To sign up, go to [email or web address] . Click on \"Log in\" on the left side of the screen, which will take you to the Welcome page. Then click on \"Sign up Now\" on the right side of the page.     You will be asked to enter the access code listed below, as well as some personal information. Please follow the directions to create your username and password.     Your access code " is: [email or web address]  Expires: 6/10/2018  8:40 AM     Your access code will  in 90 days. If you need help or a new code, please call your [place] clinic or [phone].        Care EveryWhere ID     This is your Care EveryWhere ID. This could be used by other organizations to access your [place] medical records  [id number]        Your Vitals Were     Pulse Pulse Oximetry BMI (Body Mass Index)             66 93% 37.12 kg/m2          Blood Pressure from Last 3 Encounters:   18 122/74   17 118/66   17 108/66    Weight from Last 3 Encounters:   18 107.5 kg (237 lb)   18 107.5 kg (237 lb)   18 107.5 kg (237 lb)              We Performed the Following     LABYRINTHOTOMY W/WO CRYO-GENT INJ TRANSCANAL        Primary Care Provider Office Phone # Fax #    Bob Khan [phone]       [facility] [address] [place] [address]        Goals        General    I would like a visitor in my home so I am not as lonely/Start Date 10/26/2015 (pt-stated)     Notes - Note created  10/27/2015  1:47 PM by Alexia Daily MSW    As of today's date 10/27/2015 goal is met at 26 - 50%.   Goal Status:  Active  Patient signed Consent to Communicate for me to make a referral for him to have a Senior  through [facility]. Patient also has a  come into his home weekly, as well as family members and friends either call, or stop in during the week.          Equal Access to Services     [facility] AH: Hadsamantha Jackson, price nguyễn, anselmo olvera. So [facility] [phone].    ATENCIÓN: Si habla español, tiene a christian disposición servicios gratuitos de asistencia lingüística. Llame al [phone].    We comply with applicable federal civil rights laws and [place] laws. We do not discriminate on the basis of race, color, national origin, age, disability, sex,  sexual orientation, or gender identity.            Thank you!     Thank you for choosing [facility]  for your care. Our goal is always to provide you with excellent care. Hearing back from our patients is one way we can continue to improve our services. Please take a few minutes to complete the written survey that you may receive in the mail after your visit with us. Thank you!             Your Updated Medication List - Protect others around you: Learn how to safely use, store and throw away your medicines at www.disposemymeds.org.          This list is accurate as of 3/14/18 11:59 PM.  Always use your most recent med list.                   Brand Name Dispense Instructions for use Diagnosis    ACCU-CHEK JOHN PLUS test strip   Generic drug:  blood glucose monitoring     5 Box    TEST FIVE TIMES DAILY (MD GUERRA)    Type 2 diabetes mellitus with hypoglycemia without coma (H)       albuterol 108 (90 BASE) MCG/ACT Inhaler    PROAIR HFA/PROVENTIL HFA/VENTOLIN HFA    1 Inhaler    Inhale 2 puffs into the lungs 4 times daily as needed for shortness of breath / dyspnea    Chronic diastolic congestive heart failure (H)       allopurinol 100 MG tablet    ZYLOPRIM    90 tablet    TAKE ONE TABLET BY MOUTH EVERY DAY    Chronic gout without tophus, unspecified cause, unspecified site       aspirin 81 MG EC tablet     90 tablet    Take 1 tablet (81 mg) by mouth daily    Type 2 diabetes, HbA1C goal < 8% (H)       BASAGLAR 100 UNIT/ML injection     45 mL    Inject 25 Units Subcutaneous 2 times daily    Type 2 diabetes mellitus with hypoglycemia without coma, with long-term current use of insulin (H)       blood glucose monitoring lancets     306 each    TEST BLOOD SUGARS THREE TIMES DAILY OR AS DIRECTED    Type 2 diabetes mellitus without complication, unspecified long term insulin use status (H)       blood glucose monitoring meter device kit     1 kit    Use to test blood sugars 5 times daily or as directed.    Type  2 diabetes mellitus without complication (H)       carvedilol 6.25 MG tablet    COREG    540 tablet    Take 18.75 mg by mouth 2 times daily (with meals)    Acute on chronic congestive heart failure, unspecified congestive heart failure type (H)       EYE VITAMINS & MINERALS Tabs      Take 2 tablets by mouth daily (Focus Brand)        fish Oil 1200 MG capsule      Take 1 capsule by mouth 2 times daily        FLUoxetine 20 MG capsule    PROzac    90 capsule    Take 1 capsule (20 mg) by mouth daily    Adjustment disorder with depressed mood       ipratropium - albuterol 0.5 mg/2.5 mg/3 mL 0.5-2.5 (3) MG/3ML neb solution    DUONEB     Take 1 vial by nebulization every 6 hours as needed for shortness of breath / dyspnea or wheezing        methadone 5 MG tablet    DOLOPHINE     Take 10 mg by mouth 2 times daily        metolazone 5 MG tablet    ZAROXOLYN     Take 5 mg by mouth M,W,F        MUCINEX 600 MG 12 hr tablet   Generic drug:  guaiFENesin      Take 600 mg by mouth 2 times daily as needed        order for DME     1 Device    Equipment being ordered: Wheelchair-electric, [employer] mobility device Face to face exam Sept 25th, 2013 Diagnosis COPD(496), CHF (428) Length of need 99 months Weight 303#    Chronic airway obstruction, not elsewhere classified, Congestive heart failure, unspecified       polyethylene glycol powder    MIRALAX/GLYCOLAX     Take 1 capful by mouth daily as needed for constipation        potassium chloride 10 MEQ CR capsule    MICRO-K     Take 10 mEq by mouth 2 times daily        pravastatin 80 MG tablet    PRAVACHOL    90 tablet    Take 1 tablet (80 mg) by mouth daily    Hyperlipidemia LDL goal <100       SYSTANE 0.4-0.3 % Soln ophthalmic solution   Generic drug:  polyethylene glycol 0.4%- propylene glycol 0.3%      Place 1 drop into both eyes 4 times daily as needed for dry eyes        torsemide 20 MG tablet    DEMADEX    360 tablet    40 mg in AM and 40 mg at noon    Generalized edema        traZODone 50 MG tablet    DESYREL     Take by mouth At Bedtime 1-2 tabs at bedtime        triamcinolone acetonide 0.05 % Oint     90 g    Apply Topically two times a day as needed    Rash       ULTICARE SHORT 31G X 8 MM   Generic drug:  insulin pen needle     300 each    USE ONE FOUR TIMES A DAY (TWO WITH LANTUS AND TWO WITH BYETTA)    Type 2 diabetes, HbA1C goal < 8% (H)

## 2018-03-14 NOTE — NURSING NOTE
"No chief complaint on file.      Initial Pulse 66  Wt 107.5 kg (237 lb)  SpO2 93%  BMI 37.12 kg/m2 Estimated body mass index is 37.12 kg/(m^2) as calculated from the following:    Height as of 3/12/18: 1.702 m (5' 7\").    Weight as of this encounter: 107.5 kg (237 lb).  Medication Reconciliation: complete  "

## 2018-03-14 NOTE — LETTER
3/14/2018         RE: Geo Martinez  [address] [place] [id number]        Dear Colleague,    Thank you for referring your patient, Geo Martinez, to the [facility]. Please see a copy of my visit note below.    Procedure - Middle ear perfusion right  ear for sudden SNHL.      Procedure - After discussion of the risks and benefits of Middle ear profusion right  ear for No diagnosis found., informed consent was signed and placed in the chart.  I began with the right  side.  I proceeded to position the patient in a semi-supine position in the examination chair.  Using the binocular surgical microscope, I then proceeded to clean the canal of cerumen and squamous debris.  I was able to see the tympanic membrane.  I injected into the posterior inferior quadrant over the round window.  I injected 0.5 ml of Decadron 10 mg per ML into her middle ear using a spinal 24 colt needle.       A/P - After the procedure I placed Geo into a sitting position and had   him sit still for 25 mins to allow for any dizziness.  he was also offered a ice pack for comfort. I will see him in 1 week for follow up and repeat injection.  The patient was instructed to call in or return sooner if there was any questions or concerns.  Geo tolerated the procedure well and endorsed understanding of follow up and when to contact the clinic.      This document serves as a record of the services and decisions personally performed and made by Dr. Jaspal Ferrari MD. It was created on his behalf by Ashlyn Ledezma, a trained medical scribe. The creation of this document is based the provider's statements to the medical scribe.  Ashlyn Ledezma 8:02 PM 3/14/2018    Provider:   The information in this document, created by the medical scribe for me, accurately reflects the services I personally performed and the decisions made by me. I have reviewed and approved this document for accuracy prior to leaving the  patient care area.  Dr. Jaspal Ferrari MD 8:02 PM 3/14/2018    Jaspal Ferrari M.D.      Again, thank you for allowing me to participate in the care of your patient.        Sincerely,        Jaspal Ferrari MD, MD

## 2018-03-14 NOTE — PROGRESS NOTES
Procedure - Middle ear perfusion right  ear for sudden SNHL.      Procedure - After discussion of the risks and benefits of Middle ear profusion right  ear for No diagnosis found., informed consent was signed and placed in the chart.  I began with the right  side.  I proceeded to position the patient in a semi-supine position in the examination chair.  Using the binocular surgical microscope, I then proceeded to clean the canal of cerumen and squamous debris.  I was able to see the tympanic membrane.  I injected into the posterior inferior quadrant over the round window.  I injected 0.5 ml of Decadron 10 mg per ML into her middle ear using a spinal 24 colt needle.       A/P - After the procedure I placed Geo into a sitting position and had   him sit still for 25 mins to allow for any dizziness.  he was also offered a ice pack for comfort. I will see him in 1 week for follow up and repeat injection.  The patient was instructed to call in or return sooner if there was any questions or concerns.  Geo tolerated the procedure well and endorsed understanding of follow up and when to contact the clinic.      This document serves as a record of the services and decisions personally performed and made by Dr. Jaspal Ferrari MD. It was created on his behalf by Ashlyn Ledezma, a trained medical scribe. The creation of this document is based the provider's statements to the medical scribe.  Ashlyn Ledezma 8:02 PM 3/14/2018    Provider:   The information in this document, created by the medical scribe for me, accurately reflects the services I personally performed and the decisions made by me. I have reviewed and approved this document for accuracy prior to leaving the patient care area.  Dr. Jaspal Ferrari MD 8:02 PM 3/14/2018    Jaspal Ferrari M.D.

## 2018-03-21 NOTE — LETTER
3/21/2018         RE: Geo Martinez  [address] [place] [id number]        Dear Colleague,    Thank you for referring your patient, Geo Martinez, to the [facility]. Please see a copy of my visit note below.    Procedure - Middle ear profusion right  ear for No diagnosis found.      Procedure - After discussion of the risks and benefits of Middle ear perfusion right  ear for No diagnosis found., informed consent was signed and placed in the chart.  I began with the right  side.  I proceeded to position the patient in a semi-supine position in the examination chair.  Using the binocular surgical microscope, I then proceeded to clean the canal of cerumen and squamous debris.  I was able to see the tympanic membrane.  I injected into the posterior inferior.  I injected 0.5 ml of Decadron 10 mg per ML into her middle ear using a spinal 24 colt needle.       A/P - After the procedure I placed Geo into a sitting position and had   him sit still for 25 mins to allow for any dizziness.  he was also offered a ice pack for comfort. I will see him in 1 week for follow up and repeat injection.  The patient was instructed to call in or return sooner if there was any questions or concerns.  Geo tolerated the procedure well and endorsed understanding of follow up and when to contact the clinic.      This document serves as a record of the services and decisions personally performed and made by Dr. Jaspal Ferrari MD. It was created on his behalf by Ashlyn Ledezma, a trained medical scribe. The creation of this document is based the provider's statements to the medical scribe.  Ashlyn Ledezma 1:15 PM 3/21/2018    Provider:   The information in this document, created by the medical scribe for me, accurately reflects the services I personally performed and the decisions made by me. I have reviewed and approved this document for accuracy prior to leaving the patient care area.    Jaspal Ferrari MD 1:15 PM 3/21/2018    Jaspal Ferrari M.D.      Again, thank you for allowing me to participate in the care of your patient.        Sincerely,        Jaspal Ferrari MD, MD

## 2018-03-21 NOTE — MR AVS SNAPSHOT
"              After Visit Summary   3/21/2018    Geo Martinez    MRN: [id number]           Patient Information     Date Of Birth          1940        Visit Information        Provider Department      3/21/2018 11:30 AM Jaspal Ferrari MD [facility]         Follow-ups after your visit        Your next 10 appointments already scheduled     Mar 28, 2018  2:45 PM CDT   New Visit with Jaspal Ferrari MD   [facility] ([facility]    [address] [place] [address] [address]  [facility] [id number] [phone]              Who to contact     If you have questions or need follow up information about today's clinic visit or your schedule please contact [facility] directly at [phone].  Normal or non-critical lab and imaging results will be communicated to you by MyChart, letter or phone within 4 business days after the clinic has received the results. If you do not hear from us within 7 days, please contact the clinic through MyChart or phone. If you have a critical or abnormal lab result, we will notify you by phone as soon as possible.  Submit refill requests through [employer] or call your pharmacy and they will forward the refill request to us. Please allow 3 business days for your refill to be completed.          Additional Information About Your Visit        MyChart Information     [employer] lets you send messages to your doctor, view your test results, renew your prescriptions, schedule appointments and more. To sign up, go to [email or web address] . Click on \"Log in\" on the left side of the screen, which will take you to the Welcome page. Then click on \"Sign up Now\" on the right side of the page.     You will be asked to enter the access code listed below, as well as some personal information. Please follow the directions to create your username and password.     Your access code is: [email or web address]  Expires: 6/10/2018  8:40 AM     Your access code " will  in 90 days. If you need help or a new code, please call your [place] clinic or [phone].        Care EveryWhere ID     This is your Care EveryWhere ID. This could be used by other organizations to access your [place] medical records  [id number]         Blood Pressure from Last 3 Encounters:   18 122/74   17 118/66   17 108/66    Weight from Last 3 Encounters:   18 107.5 kg (237 lb)   18 107.5 kg (237 lb)   18 107.5 kg (237 lb)              Today, you had the following     No orders found for display       Primary Care Provider Office Phone # Fax #    Bob Khan [phone]       [facility] [address] [place] [address]        Goals        General    I would like a visitor in my home so I am not as lonely/Start Date 10/26/2015 (pt-stated)     Notes - Note created  10/27/2015  1:47 PM by Alexia Daily MSW    As of today's date 10/27/2015 goal is met at 26 - 50%.   Goal Status:  Active  Patient signed Consent to Communicate for me to make a referral for him to have a Senior  through [facility]. Patient also has a  come into his home weekly, as well as family members and friends either call, or stop in during the week.          Equal Access to Services     [facility]: Hadii joelle ku hadasho Sorulaali, waaxda luqadaha, qaybta kaalmada adenellieyada, anselmo olsen . So [facility] [phone].    ATENCIÓN: Si habla español, tiene a christian disposición servicios gratuitos de asistencia lingüística. Chandrika al [phone].    We comply with applicable federal civil rights laws and [place] laws. We do not discriminate on the basis of race, color, national origin, age, disability, sex, sexual orientation, or gender identity.            Thank you!     Thank you for choosing [facility]  for your care. Our goal is always to provide you with excellent care.  Hearing back from our patients is one way we can continue to improve our services. Please take a few minutes to complete the written survey that you may receive in the mail after your visit with us. Thank you!             Your Updated Medication List - Protect others around you: Learn how to safely use, store and throw away your medicines at www.disposemymeds.org.          This list is accurate as of 3/21/18  1:27 PM.  Always use your most recent med list.                   Brand Name Dispense Instructions for use Diagnosis    ACCU-CHEK JOHN PLUS test strip   Generic drug:  blood glucose monitoring     5 Box    TEST FIVE TIMES DAILY (MD GUERRA)    Type 2 diabetes mellitus with hypoglycemia without coma (H)       albuterol 108 (90 BASE) MCG/ACT Inhaler    PROAIR HFA/PROVENTIL HFA/VENTOLIN HFA    1 Inhaler    Inhale 2 puffs into the lungs 4 times daily as needed for shortness of breath / dyspnea    Chronic diastolic congestive heart failure (H)       allopurinol 100 MG tablet    ZYLOPRIM    90 tablet    TAKE ONE TABLET BY MOUTH EVERY DAY    Chronic gout without tophus, unspecified cause, unspecified site       aspirin 81 MG EC tablet     90 tablet    Take 1 tablet (81 mg) by mouth daily    Type 2 diabetes, HbA1C goal < 8% (H)       BASAGLAR 100 UNIT/ML injection     45 mL    Inject 25 Units Subcutaneous 2 times daily    Type 2 diabetes mellitus with hypoglycemia without coma, with long-term current use of insulin (H)       blood glucose monitoring lancets     306 each    TEST BLOOD SUGARS THREE TIMES DAILY OR AS DIRECTED    Type 2 diabetes mellitus without complication, unspecified long term insulin use status (H)       blood glucose monitoring meter device kit     1 kit    Use to test blood sugars 5 times daily or as directed.    Type 2 diabetes mellitus without complication (H)       carvedilol 6.25 MG tablet    COREG    540 tablet    Take 18.75 mg by mouth 2 times daily (with meals)    Acute on chronic congestive  heart failure, unspecified congestive heart failure type (H)       EYE VITAMINS & MINERALS Tabs      Take 2 tablets by mouth daily (Focus Brand)        fish Oil 1200 MG capsule      Take 1 capsule by mouth 2 times daily        FLUoxetine 20 MG capsule    PROzac    90 capsule    Take 1 capsule (20 mg) by mouth daily    Adjustment disorder with depressed mood       ipratropium - albuterol 0.5 mg/2.5 mg/3 mL 0.5-2.5 (3) MG/3ML neb solution    DUONEB     Take 1 vial by nebulization every 6 hours as needed for shortness of breath / dyspnea or wheezing        methadone 5 MG tablet    DOLOPHINE     Take 10 mg by mouth 2 times daily        metolazone 5 MG tablet    ZAROXOLYN     Take 5 mg by mouth M,W,F        MUCINEX 600 MG 12 hr tablet   Generic drug:  guaiFENesin      Take 600 mg by mouth 2 times daily as needed        order for DME     1 Device    Equipment being ordered: Wheelchair-electric, [employer] mobility device Face to face exam Sept 25th, 2013 Diagnosis COPD(496), CHF (428) Length of need 99 months Weight 303#    Chronic airway obstruction, not elsewhere classified, Congestive heart failure, unspecified       polyethylene glycol powder    MIRALAX/GLYCOLAX     Take 1 capful by mouth daily as needed for constipation        potassium chloride 10 MEQ CR capsule    MICRO-K     Take 10 mEq by mouth 2 times daily        pravastatin 80 MG tablet    PRAVACHOL    90 tablet    Take 1 tablet (80 mg) by mouth daily    Hyperlipidemia LDL goal <100       SYSTANE 0.4-0.3 % Soln ophthalmic solution   Generic drug:  polyethylene glycol 0.4%- propylene glycol 0.3%      Place 1 drop into both eyes 4 times daily as needed for dry eyes        torsemide 20 MG tablet    DEMADEX    360 tablet    40 mg in AM and 40 mg at noon    Generalized edema       traZODone 50 MG tablet    DESYREL     Take by mouth At Bedtime 1-2 tabs at bedtime        triamcinolone acetonide 0.05 % Oint     90 g    Apply Topically two times a day as needed     Rash       DAMON SHORT 31G X 8 MM   Generic drug:  insulin pen needle     300 each    USE ONE FOUR TIMES A DAY (TWO WITH LANTUS AND TWO WITH BYETTA)    Type 2 diabetes, HbA1C goal < 8% (H)

## 2018-03-21 NOTE — PROGRESS NOTES
Procedure - Middle ear profusion right  ear for No diagnosis found.      Procedure - After discussion of the risks and benefits of Middle ear perfusion right  ear for No diagnosis found., informed consent was signed and placed in the chart.  I began with the right  side.  I proceeded to position the patient in a semi-supine position in the examination chair.  Using the binocular surgical microscope, I then proceeded to clean the canal of cerumen and squamous debris.  I was able to see the tympanic membrane.  I injected into the posterior inferior.  I injected 0.5 ml of Decadron 10 mg per ML into her middle ear using a spinal 24 colt needle.       A/P - After the procedure I placed Geo into a sitting position and had   him sit still for 25 mins to allow for any dizziness.  he was also offered a ice pack for comfort. I will see him in 1 week for follow up and repeat injection.  The patient was instructed to call in or return sooner if there was any questions or concerns.  Geo tolerated the procedure well and endorsed understanding of follow up and when to contact the clinic.      This document serves as a record of the services and decisions personally performed and made by Dr. Jaspal Ferrari MD. It was created on his behalf by Ashlyn Ledezma, a trained medical scribe. The creation of this document is based the provider's statements to the medical scribe.  Ashlyn Ledezma 1:15 PM 3/21/2018    Provider:   The information in this document, created by the medical scribe for me, accurately reflects the services I personally performed and the decisions made by me. I have reviewed and approved this document for accuracy prior to leaving the patient care area.  Dr. Jaspal Ferrari MD 1:15 PM 3/21/2018    Jaspal Ferrari M.D.

## 2018-03-28 NOTE — LETTER
3/28/2018         RE: Geo Martinez  [address] [place] [id number]        Dear Colleague,    Thank you for referring your patient, Geo Martinez, to the [facility]. Please see a copy of my visit note below.    Procedure - Middle ear profusion left  ear for No diagnosis found.      Procedure - After discussion of the risks and benefits of Middle ear profusion left  ear for No diagnosis found., informed consent was signed and placed in the chart.  I began with the left  side.  I proceeded to position the patient in a semi-supine position in the examination chair.  Using the binocular surgical microscope, I then proceeded to clean the canal of cerumen and squamous debris.  I was able to see the tympanic membrane.  I injected into the posterior inferior quadrant.  I injected 0.5 ml of Decadron 10 mg per ML into her middle ear using a spinal 24 colt needle.       A/P - After the procedure I placed Geo into a sitting position and had   him sit still for 25 mins to allow for any dizziness.  he was also offered a ice pack for comfort. I will see him in 1 week for follow up. The patient was instructed to call in or return sooner if there was any questions or concerns.  Geo tolerated the procedure well and endorsed understanding of follow up and when to contact the clinic.  The patient is doing remarkably well with a significant improvement of his hearing on the right side as well as word recognition score.  Wood recognition score went from 0-96% on the right left being stable patient is very satisfied.  He will come back for recheck in about 5 weeks at which point we will repeat hearing testing.    This document serves as a record of the services and decisions personally performed and made by Dr. Jaspal Ferrari MD. It was created on his behalf by Ashlyn Ledezma, a trained medical scribe. The creation of this document is based the provider's statements to the medical  tomeka.  Ashlyn Ledezma 2:26 PM 3/28/2018    Provider:   The information in this document, created by the medical scribe for me, accurately reflects the services I personally performed and the decisions made by me. I have reviewed and approved this document for accuracy prior to leaving the patient care area.  Dr. Jaspal Ferrari MD 2:26 PM 3/28/2018    Jaspal Ferrari M.D.      Again, thank you for allowing me to participate in the care of your patient.        Sincerely,        Jaspal Ferrari MD, MD

## 2018-03-28 NOTE — PROGRESS NOTES
AUDIOLOGY REPORT: HEARING EXAM    SUBJECTIVE:  Geo Martinez is a 77 year old male referred to audiology from ENT by Dr. Ferrari for a hearing examination. Patient reports improvement in hearing since his last exam on 3/12/18.    OBJECTIVE:    Otoscopy:   RIGHT: non-occluding cerumen   LEFT:  clear ear canal    Tympanometry:   RIGHT:  large ear canal volume consistent with tympanic membrane perforation   LEFT:   large ear canal volume consistent with tympanic membrane perforation    Thresholds:   Pure Tone Thresholds assessed using standard techniques with fair to good  reliability from 250-8000 Hz bilaterally using circumaural headphones.   RIGHT:  moderate sensorineural hearing loss   LEFT:   severe to profound sensorineural hearing loss    Speech Reception Threshold:   RIGHT:  45 dB HL   LEFT:    80 dB HL    Word Recognition Score:    RIGHT:  96% at 85 dB HL using NU-6 recorded word list   LEFT:    12% at 85 dB HL using NU-6 recorded word list    ASSESSMENT:  Asymmetric sensorineural hearing loss  Disorder of both eustachian tubes    Discussed results with the patient.     PLAN:  Patient was returned to ENT for follow up.     Edward Mendoza.  Licensed Audiologist, [place] #[id number]  [facility]  3/28/2018

## 2018-03-28 NOTE — PROGRESS NOTES
Procedure - Middle ear profusion left  ear for No diagnosis found.      Procedure - After discussion of the risks and benefits of Middle ear profusion left  ear for No diagnosis found., informed consent was signed and placed in the chart.  I began with the left  side.  I proceeded to position the patient in a semi-supine position in the examination chair.  Using the binocular surgical microscope, I then proceeded to clean the canal of cerumen and squamous debris.  I was able to see the tympanic membrane.  I injected into the posterior inferior quadrant.  I injected 0.5 ml of Decadron 10 mg per ML into her middle ear using a spinal 24 colt needle.       A/P - After the procedure I placed Geo into a sitting position and had   him sit still for 25 mins to allow for any dizziness.  he was also offered a ice pack for comfort. I will see him in 1 week for follow up. The patient was instructed to call in or return sooner if there was any questions or concerns.  Geo tolerated the procedure well and endorsed understanding of follow up and when to contact the clinic.  The patient is doing remarkably well with a significant improvement of his hearing on the right side as well as word recognition score.  Wood recognition score went from 0-96% on the right left being stable patient is very satisfied.  He will come back for recheck in about 5 weeks at which point we will repeat hearing testing.    This document serves as a record of the services and decisions personally performed and made by Dr. Jaspal Ferrari MD. It was created on his behalf by Ashlyn Ledezma, a trained medical scribe. The creation of this document is based the provider's statements to the medical scribe.  Ashlyn Ledezma 2:26 PM 3/28/2018    Provider:   The information in this document, created by the medical scribe for me, accurately reflects the services I personally performed and the decisions made by me. I have reviewed and approved this document for  accuracy prior to leaving the patient care area.  Dr. Jaspal Ferrari MD 2:26 PM 3/28/2018    Jaspal Ferrari M.D.

## 2018-03-28 NOTE — MR AVS SNAPSHOT
"              After Visit Summary   3/28/2018    Geo Martinez    MRN: [id number]           Patient Information     Date Of Birth          1940        Visit Information        Provider Department      3/28/2018 2:45 PM Jaspal Ferrari MD [facility]        Today's Diagnoses     Sudden right hearing loss    -  1       Follow-ups after your visit        Your next 10 appointments already scheduled     May 03, 2018 10:00 AM CDT   Return Visit with Peri Beltre   [facility] ([address] [id number] [phone]            May 03, 2018 10:30 AM CDT   Return Visit with Jaspal Ferrrai MD   [facility] ([address] [address] [phone]              Who to contact     If you have questions or need follow up information about today's clinic visit or your schedule please contact [facility] directly at [phone].  Normal or non-critical lab and imaging results will be communicated to you by Incaphart, letter or phone within 4 business days after the clinic has received the results. If you do not hear from us within 7 days, please contact the clinic through SuccessTSMt or phone. If you have a critical or abnormal lab result, we will notify you by phone as soon as possible.  Submit refill requests through Power Content or call your pharmacy and they will forward the refill request to us. Please allow 3 business days for your refill to be completed.          Additional Information About Your Visit        Incaphart Information     Power Content lets you send messages to your doctor, view your test results, renew your prescriptions, schedule appointments and more. To sign up, go to [email or web address] . Click on \"Log in\" on the left side of the screen, which will take you to the Welcome page. Then click on \"Sign up Now\" on the right side of the " page.     You will be asked to enter the access code listed below, as well as some personal information. Please follow the directions to create your username and password.     Your access code is: [email or web address]  Expires: 6/10/2018  8:40 AM     Your access code will  in 90 days. If you need help or a new code, please call your [place] clinic or [phone].        Care EveryWhere ID     This is your Care EveryWhere ID. This could be used by other organizations to access your [place] medical records  [id number]        Your Vitals Were     Pulse Pulse Oximetry BMI (Body Mass Index)             56 91% 37.12 kg/m2          Blood Pressure from Last 3 Encounters:   18 122/74   17 118/66   17 108/66    Weight from Last 3 Encounters:   18 107.5 kg (237 lb)   18 107.5 kg (237 lb)   18 107.5 kg (237 lb)              We Performed the Following     LABYRINTHOTOMY W/WO CRYO-GENT INJ TRANSCANAL        Primary Care Provider Office Phone # Fax #    Bob Khan [phone]       [address] [place] [address]        Goals        General    I would like a visitor in my home so I am not as lonely/Start Date 10/26/2015 (pt-stated)     Notes - Note created  10/27/2015  1:47 PM by Alexia Daily MSW    As of today's date 10/27/2015 goal is met at 26 - 50%.   Goal Status:  Active  Patient signed Consent to Communicate for me to make a referral for him to have a Senior  through [facility]. Patient also has a  come into his home weekly, as well as family members and friends either call, or stop in during the week.          Equal Access to Services     JORDI MIRANDA : Alethea Jackson, price nguyễn, anselmo olvera. So [facility] [phone].    ATENCIÓN: Si habla español, tiene a christian disposición servicios gratuitos de asistencia lingüística. Llame al  [phone].    We comply with applicable federal civil rights laws and [place] laws. We do not discriminate on the basis of race, color, national origin, age, disability, sex, sexual orientation, or gender identity.            Thank you!     Thank you for choosing [facility]  for your care. Our goal is always to provide you with excellent care. Hearing back from our patients is one way we can continue to improve our services. Please take a few minutes to complete the written survey that you may receive in the mail after your visit with us. Thank you!             Your Updated Medication List - Protect others around you: Learn how to safely use, store and throw away your medicines at www.disposemymeds.org.          This list is accurate as of 3/28/18  4:27 PM.  Always use your most recent med list.                   Brand Name Dispense Instructions for use Diagnosis    ACCU-CHEK JOHN PLUS test strip   Generic drug:  blood glucose monitoring     5 Box    TEST FIVE TIMES DAILY (MD GUERRA)    Type 2 diabetes mellitus with hypoglycemia without coma (H)       albuterol 108 (90 BASE) MCG/ACT Inhaler    PROAIR HFA/PROVENTIL HFA/VENTOLIN HFA    1 Inhaler    Inhale 2 puffs into the lungs 4 times daily as needed for shortness of breath / dyspnea    Chronic diastolic congestive heart failure (H)       allopurinol 100 MG tablet    ZYLOPRIM    90 tablet    TAKE ONE TABLET BY MOUTH EVERY DAY    Chronic gout without tophus, unspecified cause, unspecified site       aspirin 81 MG EC tablet     90 tablet    Take 1 tablet (81 mg) by mouth daily    Type 2 diabetes, HbA1C goal < 8% (H)       BASAGLAR 100 UNIT/ML injection     45 mL    Inject 25 Units Subcutaneous 2 times daily    Type 2 diabetes mellitus with hypoglycemia without coma, with long-term current use of insulin (H)       blood glucose monitoring lancets     306 each    TEST BLOOD SUGARS THREE TIMES DAILY OR AS DIRECTED    Type 2 diabetes mellitus without  complication, unspecified long term insulin use status (H)       blood glucose monitoring meter device kit     1 kit    Use to test blood sugars 5 times daily or as directed.    Type 2 diabetes mellitus without complication (H)       carvedilol 6.25 MG tablet    COREG    540 tablet    Take 18.75 mg by mouth 2 times daily (with meals)    Acute on chronic congestive heart failure, unspecified congestive heart failure type (H)       EYE VITAMINS & MINERALS Tabs      Take 2 tablets by mouth daily (Focus Brand)        fish Oil 1200 MG capsule      Take 1 capsule by mouth 2 times daily        FLUoxetine 20 MG capsule    PROzac    90 capsule    Take 1 capsule (20 mg) by mouth daily    Adjustment disorder with depressed mood       ipratropium - albuterol 0.5 mg/2.5 mg/3 mL 0.5-2.5 (3) MG/3ML neb solution    DUONEB     Take 1 vial by nebulization every 6 hours as needed for shortness of breath / dyspnea or wheezing        methadone 5 MG tablet    DOLOPHINE     Take 10 mg by mouth 2 times daily        metolazone 5 MG tablet    ZAROXOLYN     Take 5 mg by mouth M,W,F        MUCINEX 600 MG 12 hr tablet   Generic drug:  guaiFENesin      Take 600 mg by mouth 2 times daily as needed        order for DME     1 Device    Equipment being ordered: Wheelchair-electric, hoveround Power mobility device Face to face exam Sept 25th, 2013 Diagnosis COPD(496), CHF (428) Length of need 99 months Weight 303#    Chronic airway obstruction, not elsewhere classified, Congestive heart failure, unspecified       polyethylene glycol powder    MIRALAX/GLYCOLAX     Take 1 capful by mouth daily as needed for constipation        potassium chloride 10 MEQ CR capsule    MICRO-K     Take 10 mEq by mouth 2 times daily        pravastatin 80 MG tablet    PRAVACHOL    90 tablet    Take 1 tablet (80 mg) by mouth daily    Hyperlipidemia LDL goal <100       SYSTANE 0.4-0.3 % Soln ophthalmic solution   Generic drug:  polyethylene glycol 0.4%- propylene glycol 0.3%       Place 1 drop into both eyes 4 times daily as needed for dry eyes        torsemide 20 MG tablet    DEMADEX    360 tablet    40 mg in AM and 40 mg at noon    Generalized edema       traZODone 50 MG tablet    DESYREL     Take by mouth At Bedtime 1-2 tabs at bedtime        triamcinolone acetonide 0.05 % Oint     90 g    Apply Topically two times a day as needed    Rash       ULTICARE SHORT 31G X 8 MM   Generic drug:  insulin pen needle     300 each    USE ONE FOUR TIMES A DAY (TWO WITH LANTUS AND TWO WITH BYETTA)    Type 2 diabetes, HbA1C goal < 8% (H)

## 2018-03-28 NOTE — MR AVS SNAPSHOT
"              After Visit Summary   3/28/2018    Geo Martinez    MRN: [id number]           Patient Information     Date Of Birth          1940        Visit Information        Provider Department      3/28/2018 1:30 PM Crow Lyons, Peri [facility]        Today's Diagnoses     Sensorineural hearing loss, asymmetrical    -  1    Disorder of both eustachian tubes           Follow-ups after your visit        Who to contact     If you have questions or need follow up information about today's clinic visit or your schedule please contact [facility] directly at [phone].  Normal or non-critical lab and imaging results will be communicated to you by Lendinerohart, letter or phone within 4 business days after the clinic has received the results. If you do not hear from us within 7 days, please contact the clinic through Lendinerohart or phone. If you have a critical or abnormal lab result, we will notify you by phone as soon as possible.  Submit refill requests through [email or web address] or call your pharmacy and they will forward the refill request to us. Please allow 3 business days for your refill to be completed.          Additional Information About Your Visit        MyChart Information     [email or web address] lets you send messages to your doctor, view your test results, renew your prescriptions, schedule appointments and more. To sign up, go to [email or web address] . Click on \"Log in\" on the left side of the screen, which will take you to the Welcome page. Then click on \"Sign up Now\" on the right side of the page.     You will be asked to enter the access code listed below, as well as some personal information. Please follow the directions to create your username and password.     Your access code is: [email or web address]  Expires: 6/10/2018  8:40 AM     Your access code will  in 90 days. If you need help or a new code, please call your [facility] or [phone].        Care EveryWhere ID     " This is your Care EveryWhere ID. This could be used by other organizations to access your [place] medical records  [id number]         Blood Pressure from Last 3 Encounters:   03/12/18 122/74   11/27/17 118/66   02/21/17 108/66    Weight from Last 3 Encounters:   03/28/18 237 lb (107.5 kg)   03/14/18 237 lb (107.5 kg)   03/12/18 237 lb (107.5 kg)              We Performed the Following     AUDIOGRAM/TYMPANOGRAM - INTERFACE     COMPREHENSIVE HEARING TEST     TYMPANOMETRY        Primary Care Provider Office Phone # Fax #    Bob Khan [phone]       [address] [place] [address]        Goals        General    I would like a visitor in my home so I am not as lonely/Start Date 10/26/2015 (pt-stated)     Notes - Note created  10/27/2015  1:47 PM by Alexia Daily MSW    As of today's date 10/27/2015 goal is met at 26 - 50%.   Goal Status:  Active  Patient signed Consent to Communicate for me to make a referral for him to have a Senior  through [facility]. Patient also has a  come into his home weekly, as well as family members and friends either call, or stop in during the week.          Equal Access to Services     JORDI [facility]STA : Hadii joelle mcmahano Soadrienne, waaxda luqadaha, qaybta kaalmada adeegyada, anselmo hanley. So [facility] [phone].    ATENCIÓN: Si habla español, tiene a christian disposición servicios gratuitos de asistencia lingüística. Llcharity al [phone].    We comply with applicable federal civil rights laws and [place] laws. We do not discriminate on the basis of race, color, national origin, age, disability, sex, sexual orientation, or gender identity.            Thank you!     Thank you for choosing [facility]  for your care. Our goal is always to provide you with excellent care. Hearing back from our patients is one way we can continue to improve our services. Please take a few minutes  to complete the written survey that you may receive in the mail after your visit with us. Thank you!             Your Updated Medication List - Protect others around you: Learn how to safely use, store and throw away your medicines at www.disposemymeds.org.          This list is accurate as of 3/28/18  3:30 PM.  Always use your most recent med list.                   Brand Name Dispense Instructions for use Diagnosis    ACCU-CHEK JOHN PLUS test strip   Generic drug:  blood glucose monitoring     5 Box    TEST FIVE TIMES DAILY (MD GUERRA)    Type 2 diabetes mellitus with hypoglycemia without coma (H)       albuterol 108 (90 BASE) MCG/ACT Inhaler    PROAIR HFA/PROVENTIL HFA/VENTOLIN HFA    1 Inhaler    Inhale 2 puffs into the lungs 4 times daily as needed for shortness of breath / dyspnea    Chronic diastolic congestive heart failure (H)       allopurinol 100 MG tablet    ZYLOPRIM    90 tablet    TAKE ONE TABLET BY MOUTH EVERY DAY    Chronic gout without tophus, unspecified cause, unspecified site       aspirin 81 MG EC tablet     90 tablet    Take 1 tablet (81 mg) by mouth daily    Type 2 diabetes, HbA1C goal < 8% (H)       BASAGLAR 100 UNIT/ML injection     45 mL    Inject 25 Units Subcutaneous 2 times daily    Type 2 diabetes mellitus with hypoglycemia without coma, with long-term current use of insulin (H)       blood glucose monitoring lancets     306 each    TEST BLOOD SUGARS THREE TIMES DAILY OR AS DIRECTED    Type 2 diabetes mellitus without complication, unspecified long term insulin use status (H)       blood glucose monitoring meter device kit     1 kit    Use to test blood sugars 5 times daily or as directed.    Type 2 diabetes mellitus without complication (H)       carvedilol 6.25 MG tablet    COREG    540 tablet    Take 18.75 mg by mouth 2 times daily (with meals)    Acute on chronic congestive heart failure, unspecified congestive heart failure type (H)       EYE VITAMINS & MINERALS Tabs      Take 2  tablets by mouth daily (Focus Brand)        fish Oil 1200 MG capsule      Take 1 capsule by mouth 2 times daily        FLUoxetine 20 MG capsule    PROzac    90 capsule    Take 1 capsule (20 mg) by mouth daily    Adjustment disorder with depressed mood       ipratropium - albuterol 0.5 mg/2.5 mg/3 mL 0.5-2.5 (3) MG/3ML neb solution    DUONEB     Take 1 vial by nebulization every 6 hours as needed for shortness of breath / dyspnea or wheezing        methadone 5 MG tablet    DOLOPHINE     Take 10 mg by mouth 2 times daily        metolazone 5 MG tablet    ZAROXOLYN     Take 5 mg by mouth M,W,F        MUCINEX 600 MG 12 hr tablet   Generic drug:  guaiFENesin      Take 600 mg by mouth 2 times daily as needed        order for DME     1 Device    Equipment being ordered: Wheelchair-electric, [employer] mobility device Face to face exam Sept 25th, 2013 Diagnosis COPD(496), CHF (428) Length of need 99 months Weight 303#    Chronic airway obstruction, not elsewhere classified, Congestive heart failure, unspecified       polyethylene glycol powder    MIRALAX/GLYCOLAX     Take 1 capful by mouth daily as needed for constipation        potassium chloride 10 MEQ CR capsule    MICRO-K     Take 10 mEq by mouth 2 times daily        pravastatin 80 MG tablet    PRAVACHOL    90 tablet    Take 1 tablet (80 mg) by mouth daily    Hyperlipidemia LDL goal <100       SYSTANE 0.4-0.3 % Soln ophthalmic solution   Generic drug:  polyethylene glycol 0.4%- propylene glycol 0.3%      Place 1 drop into both eyes 4 times daily as needed for dry eyes        torsemide 20 MG tablet    DEMADEX    360 tablet    40 mg in AM and 40 mg at noon    Generalized edema       traZODone 50 MG tablet    DESYREL     Take by mouth At Bedtime 1-2 tabs at bedtime        triamcinolone acetonide 0.05 % Oint     90 g    Apply Topically two times a day as needed    Rash       ULTICARE SHORT 31G X 8 MM   Generic drug:  insulin pen needle     300 each    USE ONE FOUR TIMES  A DAY (TWO WITH LANTUS AND TWO WITH BYETTA)    Type 2 diabetes, HbA1C goal < 8% (H)

## 2018-05-02 NOTE — PROGRESS NOTES
History of Present Illness - Geo Martinez is a 77 year old male presenting in clinic today for a recheck on hearing after 3 middle ear profusion.    Patient reports that he is hearing much better.    Present Symptoms include: he currently has no symptoms. Geo denies otolgia.      Past Medical History -   Past Medical History:   Diagnosis Date     Atrial fibrillation (H)      Chest pain 02/03/10    D/C 02/04/10     Chronic airway obstruction, not elsewhere classified     COPD     Congestive heart failure, unspecified 09/17/2007     Diabetic eye exam (H) 08/15/11     Diabetic eye exam (H) 08/03/12     Gout, unspecified      Hyperkalemia 01/10/09    Discharged 01/12/09-[facility]     Chronic low back pain     Mixed hyperlipidemia      Other primary cardiomyopathies     Idiopathic Dilated Cardiomyopathy     Shortness of breath 03/23/09    D/C 03/27/09-[facility]     Type II or unspecified type diabetes mellitus without mention of complication, not stated as uncontrolled      Unspecified essential hypertension        Current Medications -   Current Outpatient Prescriptions:      ACCU-CHEK JOHN PLUS test strip, TEST FIVE TIMES DAILY (MD GUERRA), Disp: 5 Box, Rfl: 3     albuterol (PROAIR HFA, PROVENTIL HFA, VENTOLIN HFA) 108 (90 BASE) MCG/ACT inhaler, Inhale 2 puffs into the lungs 4 times daily as needed for shortness of breath / dyspnea, Disp: 1 Inhaler, Rfl: 5     allopurinol (ZYLOPRIM) 100 MG tablet, TAKE ONE TABLET BY MOUTH EVERY DAY, Disp: 90 tablet, Rfl: 2     aspirin 81 MG EC tablet, Take 1 tablet (81 mg) by mouth daily, Disp: 90 tablet, Rfl: 3     BASAGLAR 100 UNIT/ML injection, Inject 25 Units Subcutaneous 2 times daily, Disp: 45 mL, Rfl: 1     blood glucose monitoring (ACCU-CHEK JOHN PLUS) meter device kit, Use to test blood sugars 5 times daily or as directed., Disp: 1 kit, Rfl: 0     blood glucose monitoring (ACCU-CHEK MULTICLIX) lancets, TEST BLOOD SUGARS THREE TIMES  DAILY OR AS DIRECTED, Disp: 306 each, Rfl: 4     carvedilol (COREG) 6.25 MG tablet, Take 18.75 mg by mouth 2 times daily (with meals) , Disp: 540 tablet, Rfl: 3     FLUoxetine (PROZAC) 20 MG capsule, Take 1 capsule (20 mg) by mouth daily, Disp: 90 capsule, Rfl: 2     guaiFENesin (MUCINEX) 600 MG 12 hr tablet, Take 600 mg by mouth 2 times daily as needed , Disp: , Rfl:      ipratropium - albuterol 0.5 mg/2.5 mg/3 mL (DUONEB) 0.5-2.5 (3) MG/3ML neb solution, Take 1 vial by nebulization every 6 hours as needed for shortness of breath / dyspnea or wheezing, Disp: , Rfl:      methadone (DOLOPHINE) 5 MG tablet, Take 10 mg by mouth 2 times daily, Disp: , Rfl:      metolazone (ZAROXOLYN) 5 MG tablet, Take 5 mg by mouth M,W,F, Disp: , Rfl:      Multiple Vitamins-Minerals (EYE VITAMINS & MINERALS) TABS, Take 2 tablets by mouth daily (Focus Brand), Disp: , Rfl:      Omega-3 Fatty Acids (FISH OIL) 1200 MG CAPS, Take 1 capsule by mouth 2 times daily , Disp: , Rfl:      ORDER FOR DME, Equipment being ordered: Wheelchair-electric, [employer] mobility device Face to face exam Sept 25th, 2013 Diagnosis COPD(496), CHF (428) Length of need 99 months Weight 303#, Disp: 1 Device, Rfl: 0     polyethylene glycol (MIRALAX/GLYCOLAX) powder, Take 1 capful by mouth daily as needed for constipation, Disp: , Rfl:      polyethylene glycol 0.4%- propylene glycol 0.3% (SYSTANE) 0.4-0.3 % SOLN ophthalmic solution, Place 1 drop into both eyes 4 times daily as needed for dry eyes, Disp: , Rfl:      potassium chloride (MICRO-K) 10 MEQ CR capsule, Take 10 mEq by mouth 2 times daily, Disp: , Rfl:      pravastatin (PRAVACHOL) 80 MG tablet, Take 1 tablet (80 mg) by mouth daily, Disp: 90 tablet, Rfl: 3     torsemide (DEMADEX) 20 MG tablet, 40 mg in AM and 40 mg at noon, Disp: 360 tablet, Rfl: 3     traZODone (DESYREL) 50 MG tablet, Take by mouth At Bedtime 1-2 tabs at bedtime, Disp: , Rfl:      triamcinolone acetonide 0.05 % OINT, Apply Topically two  "times a day as needed, Disp: 90 g, Rfl: 3     ULTICARE SHORT 31G X 8 MM insulin pen needle, USE ONE FOUR TIMES A DAY (TWO WITH LANTUS AND TWO WITH BYETTA), Disp: 300 each, Rfl: 3    Allergies -   Allergies   Allergen Reactions     Metformin Diarrhea     Prednisone      Affected his eyes       Vytorin      rhabdomyolysis       Social History -   Social History     Social History     Marital status:      Spouse name: Karen     Number of children: N/A     Years of education: N/A     Social History Main Topics     Smoking status: Former Smoker     Quit date: 1/1/1986     Smokeless tobacco: Never Used      Comment: quit in 1986 after smoking for 30 years at 1-2 ppd     Alcohol use No     Drug use: No     Sexual activity: No     Other Topics Concern     Parent/Sibling W/ Cabg, Mi Or Angioplasty Before 65f 55m? No     Social History Narrative       Family History -   Family History   Problem Relation Age of Onset     Hypertension Father      Hypertension Paternal Grandfather      C.A.D. Father      C.A.D. Paternal Grandfather      Hypertension Mother      CANCER Daughter      lymphoma       Review of Systems - As per HPI and PMHx, otherwise review of system review of the head and neck negative.    Physical Exam  /60  Ht 1.702 m (5' 7\")  Wt 107.5 kg (237 lb)  SpO2 90%  BMI 37.12 kg/m2  BMI: Body mass index is 37.12 kg/(m^2).    General - The patient is well nourished and well developed, and appears to have good nutritional status.  Alert and oriented to person and place, answers questions and cooperates with examination appropriately.    SKIN - No suspicious lesions or rashes.  Respiration - No respiratory distress.  Head and Face - Normocephalic and atraumatic, with no gross asymmetry noted of the contour of the facial features.  The facial nerve is intact, with strong symmetric movements.    Voice and Breathing - The patient was breathing comfortably without the use of accessory muscles. The patients " voice was clear and strong, and had appropriate pitch and quality.    Ears - Bilateral pinna and EACs with normal appearing overlying skin. Tympanic membrane intact with good mobility on pneumatic otoscopy bilaterally. Bony landmarks of the ossicular chain are normal. The tympanic membranes are normal in appearance. No retraction, perforation, or masses.  No fluid or purulence was seen in the external canal or the middle ear.     Eyes - Extraocular movements intact.  Sclera were not icteric or injected, conjunctiva were pink and moist.    Mouth - Examination of the oral cavity showed pink, healthy oral mucosa. No lesions or ulcerations noted.  The tongue was mobile and midline, and the dentition were in good condition.      Throat - The walls of the oropharynx were smooth, pink, moist, symmetric, and had no lesions or ulcerations.  The tonsillar pillars and soft palate were symmetric. The uvula was midline on elevation.    Neck - Normal midline excursion of the laryngotracheal complex during swallowing.  Full range of motion on passive movement.  Palpation of the occipital, submental, submandibular, internal jugular chain, and supraclavicular nodes did not demonstrate any abnormal lymph nodes or masses.  The carotid pulse was palpable bilaterally.  Palpation of the thyroid was soft and smooth, with no nodules or goiter appreciated.  The trachea was mobile and midline.    Nose - External contour is symmetric, no gross deflection or scars.  Nasal mucosa is pink and moist with no abnormal mucus.  The septum was midline and non-obstructive, turbinates of normal size and position.  No polyps, masses, or purulence noted on examination.    Neuro - Nonfocal neuro exam is normal, CN 2 through 12 intact, normal gait and muscle tone.      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 Word recognition is 96% today. He has improved dramatically with the profusions. 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    A/P - Geo Martinez is a 77 year old male Patient presents with:  RECHECK  Ear Problem    Geo should follow up as needed.      At Geo next appointment they will not need a hearing test.      This document serves as a record of the services and decisions personally performed and made by Dr. Jaspal Ferrari MD. It was created on his behalf by Ashlyn Ledezma, a trained medical scribe. The creation of this document is based the provider's statements to the medical scribe.  Ashlyn Ledezma 5/3/2018    Provider:   The information in this document, created by the medical scribe for me, accurately reflects the services I personally performed and the decisions made by me. I have reviewed and approved this document for accuracy prior to leaving the patient care area.  Dr. Jaspal Ferrari MD 5/3/2018    Jaspal Ferrari MD.

## 2018-05-03 NOTE — PROGRESS NOTES
AUDIOLOGY REPORT     SUMMARY: Audiology visit completed. See audiogram for results.     RECOMMENDATIONS: Follow-up with ENT    Wesley Goldsmith Licensed Audiologist #[id number]

## 2018-05-03 NOTE — MR AVS SNAPSHOT
"              After Visit Summary   5/3/2018    Geo Martinez    MRN: [id number]           Patient Information     Date Of Birth          1940        Visit Information        Provider Department      5/3/2018 10:00 AM Susie [facility]        Today's Diagnoses     Sudden-onset sensorineural hearing loss    -  1    Mixed conductive and sensorineural hearing loss of right ear with restricted hearing of left ear           Follow-ups after your visit        Who to contact     If you have questions or need follow up information about today's clinic visit or your schedule please contact [facility] directly at [phone].  Normal or non-critical lab and imaging results will be communicated to you by MyChart, letter or phone within 4 business days after the clinic has received the results. If you do not hear from us within 7 days, please contact the clinic through MyChart or phone. If you have a critical or abnormal lab result, we will notify you by phone as soon as possible.  Submit refill requests through [facility] or call your pharmacy and they will forward the refill request to us. Please allow 3 business days for your refill to be completed.          Additional Information About Your Visit        MyChart Information     [facility] lets you send messages to your doctor, view your test results, renew your prescriptions, schedule appointments and more. To sign up, go to [email or web address] . Click on \"Log in\" on the left side of the screen, which will take you to the Welcome page. Then click on \"Sign up Now\" on the right side of the page.     You will be asked to enter the access code listed below, as well as some personal information. Please follow the directions to create your username and password.     Your access code is: [email or web address]  Expires: 6/10/2018  8:40 AM     Your access code will  in 90 days. If you need help or a new code, please call " your [place] clinic or [phone].        Care EveryWhere ID     This is your Care EveryWhere ID. This could be used by other organizations to access your [place] medical records  [id number]         Blood Pressure from Last 3 Encounters:   05/03/18 100/60   03/12/18 122/74   11/27/17 118/66    Weight from Last 3 Encounters:   05/03/18 237 lb (107.5 kg)   03/28/18 237 lb (107.5 kg)   03/14/18 237 lb (107.5 kg)              We Performed the Following     AUDIOGRAM/TYMPANOGRAM - INTERFACE     COMPREHENSIVE HEARING TEST     TYMPANOMETRY        Primary Care Provider Office Phone # Fax #    Bob Khan [phone]       [address] [place] [address]        Goals        General    I would like a visitor in my home so I am not as lonely/Start Date 10/26/2015 (pt-stated)     Notes - Note created  10/27/2015  1:47 PM by Alexia Daily MSW    As of today's date 10/27/2015 goal is met at 26 - 50%.   Goal Status:  Active  Patient signed Consent to Communicate for me to make a referral for him to have a Senior  through [facility]. Patient also has a  come into his home weekly, as well as family members and friends either call, or stop in during the week.          Equal Access to Services     APOLINAR MIRANDA : Hadii joelle faria hadasho Sorulaali, waaxda luqadaha, qaybta kaalmada yesenia, anselmo hanley. So [facility] [phone].    ATENCIÓN: Si habla español, tiene a christian disposición servicios gratuitos de asistencia lingüística. Llcharity al [phone].    We comply with applicable federal civil rights laws and [place] laws. We do not discriminate on the basis of race, color, national origin, age, disability, sex, sexual orientation, or gender identity.            Thank you!     Thank you for choosing [facility]  for your care. Our goal is always to provide you with excellent care. Hearing back from our patients is one  way we can continue to improve our services. Please take a few minutes to complete the written survey that you may receive in the mail after your visit with us. Thank you!             Your Updated Medication List - Protect others around you: Learn how to safely use, store and throw away your medicines at www.disposemymeds.org.          This list is accurate as of 5/3/18 12:00 PM.  Always use your most recent med list.                   Brand Name Dispense Instructions for use Diagnosis    ACCU-CHEK JOHN PLUS test strip   Generic drug:  blood glucose monitoring     5 Box    TEST FIVE TIMES DAILY (MD GUERRA)    Type 2 diabetes mellitus with hypoglycemia without coma (H)       albuterol 108 (90 Base) MCG/ACT Inhaler    PROAIR HFA/PROVENTIL HFA/VENTOLIN HFA    1 Inhaler    Inhale 2 puffs into the lungs 4 times daily as needed for shortness of breath / dyspnea    Chronic diastolic congestive heart failure (H)       allopurinol 100 MG tablet    ZYLOPRIM    90 tablet    TAKE ONE TABLET BY MOUTH EVERY DAY    Chronic gout without tophus, unspecified cause, unspecified site       aspirin 81 MG EC tablet     90 tablet    Take 1 tablet (81 mg) by mouth daily    Type 2 diabetes, HbA1C goal < 8% (H)       BASAGLAR 100 UNIT/ML injection     45 mL    Inject 25 Units Subcutaneous 2 times daily    Type 2 diabetes mellitus with hypoglycemia without coma, with long-term current use of insulin (H)       blood glucose monitoring lancets     306 each    TEST BLOOD SUGARS THREE TIMES DAILY OR AS DIRECTED    Type 2 diabetes mellitus without complication, unspecified long term insulin use status (H)       blood glucose monitoring meter device kit     1 kit    Use to test blood sugars 5 times daily or as directed.    Type 2 diabetes mellitus without complication (H)       carvedilol 6.25 MG tablet    COREG    540 tablet    Take 18.75 mg by mouth 2 times daily (with meals)    Acute on chronic congestive heart failure, unspecified congestive  heart failure type (H)       EYE VITAMINS & MINERALS Tabs      Take 2 tablets by mouth daily (Focus Brand)        fish Oil 1200 MG capsule      Take 1 capsule by mouth 2 times daily        FLUoxetine 20 MG capsule    PROzac    90 capsule    Take 1 capsule (20 mg) by mouth daily    Adjustment disorder with depressed mood       ipratropium - albuterol 0.5 mg/2.5 mg/3 mL 0.5-2.5 (3) MG/3ML neb solution    DUONEB     Take 1 vial by nebulization every 6 hours as needed for shortness of breath / dyspnea or wheezing        methadone 5 MG tablet    DOLOPHINE     Take 10 mg by mouth 2 times daily        metolazone 5 MG tablet    ZAROXOLYN     Take 5 mg by mouth M,W,F        MUCINEX 600 MG 12 hr tablet   Generic drug:  guaiFENesin      Take 600 mg by mouth 2 times daily as needed        order for DME     1 Device    Equipment being ordered: Wheelchair-electric, [employer] mobility device Face to face exam Sept 25th, 2013 Diagnosis COPD(496), CHF (428) Length of need 99 months Weight 303#    Chronic airway obstruction, not elsewhere classified, Congestive heart failure, unspecified       polyethylene glycol powder    MIRALAX/GLYCOLAX     Take 1 capful by mouth daily as needed for constipation        potassium chloride 10 MEQ CR capsule    MICRO-K     Take 10 mEq by mouth 2 times daily        pravastatin 80 MG tablet    PRAVACHOL    90 tablet    Take 1 tablet (80 mg) by mouth daily    Hyperlipidemia LDL goal <100       SYSTANE 0.4-0.3 % Soln ophthalmic solution   Generic drug:  polyethylene glycol 0.4%- propylene glycol 0.3%      Place 1 drop into both eyes 4 times daily as needed for dry eyes        torsemide 20 MG tablet    DEMADEX    360 tablet    40 mg in AM and 40 mg at noon    Generalized edema       traZODone 50 MG tablet    DESYREL     Take by mouth At Bedtime 1-2 tabs at bedtime        triamcinolone acetonide 0.05 % Oint     90 g    Apply Topically two times a day as needed    Rash       ULTICARE SHORT 31G X 8 MM    Generic drug:  insulin pen needle     300 each    USE ONE FOUR TIMES A DAY (TWO WITH LANTUS AND TWO WITH BYETTA)    Type 2 diabetes, HbA1C goal < 8% (H)

## 2018-05-03 NOTE — NURSING NOTE
"Chief Complaint   Patient presents with     RECHECK     Ear Problem       Initial /60  Ht 1.702 m (5' 7\")  Wt 107.5 kg (237 lb)  SpO2 90%  BMI 37.12 kg/m2 Estimated body mass index is 37.12 kg/(m^2) as calculated from the following:    Height as of this encounter: 1.702 m (5' 7\").    Weight as of this encounter: 107.5 kg (237 lb).  Medication Reconciliation: complete  "

## 2018-05-03 NOTE — LETTER
5/3/2018         RE: Geo Martinez  [address] [place] [id number]        Dear Colleague,    Thank you for referring your patient, Geo Martinez, to the [facility]. Please see a copy of my visit note below.    History of Present Illness - Geo Martinez is a 77 year old male presenting in clinic today for a recheck on hearing after 3 middle ear profusion.    Patient reports that he is hearing much better.    Present Symptoms include: he currently has no symptoms. Geo denies otolgia.      Past Medical History -   Past Medical History:   Diagnosis Date     Atrial fibrillation (H)      Chest pain 02/03/10    D/C 02/04/10     Chronic airway obstruction, not elsewhere classified     COPD     Congestive heart failure, unspecified 09/17/2007     Diabetic eye exam (H) 08/15/11     Diabetic eye exam (H) 08/03/12     Gout, unspecified      Hyperkalemia 01/10/09    Discharged 01/12/09-[facility]     Chronic low back pain     Mixed hyperlipidemia      Other primary cardiomyopathies     Idiopathic Dilated Cardiomyopathy     Shortness of breath 03/23/09    D[facility]C 03/27/09-[facility]     Type II or unspecified type diabetes mellitus without mention of complication, not stated as uncontrolled      Unspecified essential hypertension        Current Medications -   Current Outpatient Prescriptions:      ACCU-CHEK JOHN PLUS test strip, TEST FIVE TIMES DAILY (MD GUERRA), Disp: 5 Box, Rfl: 3     albuterol (PROAIR HFA, PROVENTIL HFA, VENTOLIN HFA) 108 (90 BASE) MCG/ACT inhaler, Inhale 2 puffs into the lungs 4 times daily as needed for shortness of breath / dyspnea, Disp: 1 Inhaler, Rfl: 5     allopurinol (ZYLOPRIM) 100 MG tablet, TAKE ONE TABLET BY MOUTH EVERY DAY, Disp: 90 tablet, Rfl: 2     aspirin 81 MG EC tablet, Take 1 tablet (81 mg) by mouth daily, Disp: 90 tablet, Rfl: 3     BASAGLAR 100 UNIT/ML injection, Inject 25 Units Subcutaneous 2 times daily, Disp:  45 mL, Rfl: 1     blood glucose monitoring (ACCU-CHEK JOHN PLUS) meter device kit, Use to test blood sugars 5 times daily or as directed., Disp: 1 kit, Rfl: 0     blood glucose monitoring (ACCU-CHEK MULTICLIX) lancets, TEST BLOOD SUGARS THREE TIMES DAILY OR AS DIRECTED, Disp: 306 each, Rfl: 4     carvedilol (COREG) 6.25 MG tablet, Take 18.75 mg by mouth 2 times daily (with meals) , Disp: 540 tablet, Rfl: 3     FLUoxetine (PROZAC) 20 MG capsule, Take 1 capsule (20 mg) by mouth daily, Disp: 90 capsule, Rfl: 2     guaiFENesin (MUCINEX) 600 MG 12 hr tablet, Take 600 mg by mouth 2 times daily as needed , Disp: , Rfl:      ipratropium - albuterol 0.5 mg/2.5 mg/3 mL (DUONEB) 0.5-2.5 (3) MG/3ML neb solution, Take 1 vial by nebulization every 6 hours as needed for shortness of breath / dyspnea or wheezing, Disp: , Rfl:      methadone (DOLOPHINE) 5 MG tablet, Take 10 mg by mouth 2 times daily, Disp: , Rfl:      metolazone (ZAROXOLYN) 5 MG tablet, Take 5 mg by mouth M,W,F, Disp: , Rfl:      Multiple Vitamins-Minerals (EYE VITAMINS & MINERALS) TABS, Take 2 tablets by mouth daily (Focus Brand), Disp: , Rfl:      Omega-3 Fatty Acids (FISH OIL) 1200 MG CAPS, Take 1 capsule by mouth 2 times daily , Disp: , Rfl:      ORDER FOR DME, Equipment being ordered: Wheelchair-electric, hover[employer] mobility device Face to face exam Sept 25th, 2013 Diagnosis COPD(496), CHF (428) Length of need 99 months Weight 303#, Disp: 1 Device, Rfl: 0     polyethylene glycol (MIRALAX/GLYCOLAX) powder, Take 1 capful by mouth daily as needed for constipation, Disp: , Rfl:      polyethylene glycol 0.4%- propylene glycol 0.3% (SYSTANE) 0.4-0.3 % SOLN ophthalmic solution, Place 1 drop into both eyes 4 times daily as needed for dry eyes, Disp: , Rfl:      potassium chloride (MICRO-K) 10 MEQ CR capsule, Take 10 mEq by mouth 2 times daily, Disp: , Rfl:      pravastatin (PRAVACHOL) 80 MG tablet, Take 1 tablet (80 mg) by mouth daily, Disp: 90 tablet, Rfl: 3      "torsemide (DEMADEX) 20 MG tablet, 40 mg in AM and 40 mg at noon, Disp: 360 tablet, Rfl: 3     traZODone (DESYREL) 50 MG tablet, Take by mouth At Bedtime 1-2 tabs at bedtime, Disp: , Rfl:      triamcinolone acetonide 0.05 % OINT, Apply Topically two times a day as needed, Disp: 90 g, Rfl: 3     ULTICARE SHORT 31G X 8 MM insulin pen needle, USE ONE FOUR TIMES A DAY (TWO WITH LANTUS AND TWO WITH BYETTA), Disp: 300 each, Rfl: 3    Allergies -   Allergies   Allergen Reactions     Metformin Diarrhea     Prednisone      Affected his eyes       Vytorin      rhabdomyolysis       Social History -   Social History     Social History     Marital status:      Spouse name: Karen     Number of children: N/A     Years of education: N/A     Social History Main Topics     Smoking status: Former Smoker     Quit date: 1/1/1986     Smokeless tobacco: Never Used      Comment: quit in 1986 after smoking for 30 years at 1-2 ppd     Alcohol use No     Drug use: No     Sexual activity: No     Other Topics Concern     Parent/Sibling W/ Cabg, Mi Or Angioplasty Before 65f 55m? No     Social History Narrative       Family History -   Family History   Problem Relation Age of Onset     Hypertension Father      Hypertension Paternal Grandfather      C.A.D. Father      C.A.D. Paternal Grandfather      Hypertension Mother      CANCER Daughter      lymphoma       Review of Systems - As per HPI and PMHx, otherwise review of system review of the head and neck negative.    Physical Exam  /60  Ht 1.702 m (5' 7\")  Wt 107.5 kg (237 lb)  SpO2 90%  BMI 37.12 kg/m2  BMI: Body mass index is 37.12 kg/(m^2).    General - The patient is well nourished and well developed, and appears to have good nutritional status.  Alert and oriented to person and place, answers questions and cooperates with examination appropriately.    SKIN - No suspicious lesions or rashes.  Respiration - No respiratory distress.  Head and Face - Normocephalic and atraumatic, " with no gross asymmetry noted of the contour of the facial features.  The facial nerve is intact, with strong symmetric movements.    Voice and Breathing - The patient was breathing comfortably without the use of accessory muscles. The patients voice was clear and strong, and had appropriate pitch and quality.    Ears - Bilateral pinna and EACs with normal appearing overlying skin. Tympanic membrane intact with good mobility on pneumatic otoscopy bilaterally. Bony landmarks of the ossicular chain are normal. The tympanic membranes are normal in appearance. No retraction, perforation, or masses.  No fluid or purulence was seen in the external canal or the middle ear.     Eyes - Extraocular movements intact.  Sclera were not icteric or injected, conjunctiva were pink and moist.    Mouth - Examination of the oral cavity showed pink, healthy oral mucosa. No lesions or ulcerations noted.  The tongue was mobile and midline, and the dentition were in good condition.      Throat - The walls of the oropharynx were smooth, pink, moist, symmetric, and had no lesions or ulcerations.  The tonsillar pillars and soft palate were symmetric. The uvula was midline on elevation.    Neck - Normal midline excursion of the laryngotracheal complex during swallowing.  Full range of motion on passive movement.  Palpation of the occipital, submental, submandibular, internal jugular chain, and supraclavicular nodes did not demonstrate any abnormal lymph nodes or masses.  The carotid pulse was palpable bilaterally.  Palpation of the thyroid was soft and smooth, with no nodules or goiter appreciated.  The trachea was mobile and midline.    Nose - External contour is symmetric, no gross deflection or scars.  Nasal mucosa is pink and moist with no abnormal mucus.  The septum was midline and non-obstructive, turbinates of normal size and position.  No polyps, masses, or purulence noted on examination.    Neuro - Nonfocal neuro exam is normal, CN 2  through 12 intact, normal gait and muscle tone.      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 Word recognition is 96% today. He has improved dramatically with the profusions. 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    A/P - Geo Martinez is a 77 year old male Patient presents with:  RECHECK  Ear Problem    Geo should follow up as needed.      At Geo next appointment they will not need a hearing test.      This document serves as a record of the services and decisions personally performed and made by Dr. Jaspal Ferrari MD. It was created on his behalf by Ashlyn Ledezma, a trained medical scribe. The creation of this document is based the provider's statements to the medical scribe.  Ashlyn Ledezma 5/3/2018    Provider:   The information in this document, created by the medical scribe for me, accurately reflects the services I personally performed and the decisions made by me. I have reviewed and approved this document for accuracy prior to leaving the patient care area.  Dr. Jaspal Ferrari MD 5/3/2018    Jaspal Ferrari MD.          Again, thank you for allowing me to participate in the care of your patient.        Sincerely,        Jaspal Ferrari MD, MD

## 2018-05-03 NOTE — MR AVS SNAPSHOT
"              After Visit Summary   5/3/2018    Geo Martinez    MRN: [id number]           Patient Information     Date Of Birth          1940        Visit Information        Provider Department      5/3/2018 10:30 AM Jaspal Ferrari MD [facility]         Follow-ups after your visit        Who to contact     If you have questions or need follow up information about today's clinic visit or your schedule please contact [facility] directly at [phone].  Normal or non-critical lab and imaging results will be communicated to you by MyChart, letter or phone within 4 business days after the clinic has received the results. If you do not hear from us within 7 days, please contact the clinic through [facility] or phone. If you have a critical or abnormal lab result, we will notify you by phone as soon as possible.  Submit refill requests through [employer] or call your pharmacy and they will forward the refill request to us. Please allow 3 business days for your refill to be completed.          Additional Information About Your Visit        [facility]     [employer] lets you send messages to your doctor, view your test results, renew your prescriptions, schedule appointments and more. To sign up, go to [email or web address] . Click on \"Log in\" on the left side of the screen, which will take you to the Welcome page. Then click on \"Sign up Now\" on the right side of the page.     You will be asked to enter the access code listed below, as well as some personal information. Please follow the directions to create your username and password.     Your access code is: [email or web address]  Expires: 6/10/2018  8:40 AM     Your access code will  in 90 days. If you need help or a new code, please call your [facility] or [phone].        Care EveryWhere ID     This is your Care EveryWhere ID. This could be used by other organizations to access your [place] medical " "records  [id number]        Your Vitals Were     Height Pulse Oximetry BMI (Body Mass Index)             1.702 m (5' 7\") 90% 37.12 kg/m2          Blood Pressure from Last 3 Encounters:   05/03/18 100/60   03/12/18 122/74   11/27/17 118/66    Weight from Last 3 Encounters:   05/03/18 107.5 kg (237 lb)   03/28/18 107.5 kg (237 lb)   03/14/18 107.5 kg (237 lb)              Today, you had the following     No orders found for display       Primary Care Provider Office Phone # Fax #    Bob Khan [phone]       [address] [place] [id number]        Goals        General    I would like a visitor in my home so I am not as lonely/Start Date 10/26/2015 (pt-stated)     Notes - Note created  10/27/2015  1:47 PM by Alexia Daily MSW    As of today's date 10/27/2015 goal is met at 26 - 50%.   Goal Status:  Active  Patient signed Consent to Communicate for me to make a referral for him to have a Senior  through [facility]. Patient also has a  come into his home weekly, as well as family members and friends either call, or stop in during the week.          Equal Access to Services     APOLINAR MIRANDA : Alethea islas Soadrienne, waaxda luqadaha, qaybta kaalmada yesenia, anselmo olsen . So [facility] [phone].    ATENCIÓN: Si habla español, tiene a christian disposición servicios gratuitos de asistencia lingüística. Llame al [phone].    We comply with applicable federal civil rights laws and [place] laws. We do not discriminate on the basis of race, color, national origin, age, disability, sex, sexual orientation, or gender identity.            Thank you!     Thank you for choosing [facility]  for your care. Our goal is always to provide you with excellent care. Hearing back from our patients is one way we can continue to improve our services. Please take a few minutes to complete the written survey that you " may receive in the mail after your visit with us. Thank you!             Your Updated Medication List - Protect others around you: Learn how to safely use, store and throw away your medicines at www.disposemymeds.org.          This list is accurate as of 5/3/18  1:45 PM.  Always use your most recent med list.                   Brand Name Dispense Instructions for use Diagnosis    ACCU-CHEK JOHN PLUS test strip   Generic drug:  blood glucose monitoring     5 Box    TEST FIVE TIMES DAILY (MD GUERRA)    Type 2 diabetes mellitus with hypoglycemia without coma (H)       albuterol 108 (90 Base) MCG/ACT Inhaler    PROAIR HFA/PROVENTIL HFA/VENTOLIN HFA    1 Inhaler    Inhale 2 puffs into the lungs 4 times daily as needed for shortness of breath / dyspnea    Chronic diastolic congestive heart failure (H)       allopurinol 100 MG tablet    ZYLOPRIM    90 tablet    TAKE ONE TABLET BY MOUTH EVERY DAY    Chronic gout without tophus, unspecified cause, unspecified site       aspirin 81 MG EC tablet     90 tablet    Take 1 tablet (81 mg) by mouth daily    Type 2 diabetes, HbA1C goal < 8% (H)       BASAGLAR 100 UNIT/ML injection     45 mL    Inject 25 Units Subcutaneous 2 times daily    Type 2 diabetes mellitus with hypoglycemia without coma, with long-term current use of insulin (H)       blood glucose monitoring lancets     306 each    TEST BLOOD SUGARS THREE TIMES DAILY OR AS DIRECTED    Type 2 diabetes mellitus without complication, unspecified long term insulin use status (H)       blood glucose monitoring meter device kit     1 kit    Use to test blood sugars 5 times daily or as directed.    Type 2 diabetes mellitus without complication (H)       carvedilol 6.25 MG tablet    COREG    540 tablet    Take 18.75 mg by mouth 2 times daily (with meals)    Acute on chronic congestive heart failure, unspecified congestive heart failure type (H)       EYE VITAMINS & MINERALS Tabs      Take 2 tablets by mouth daily (Focus Brand)         fish Oil 1200 MG capsule      Take 1 capsule by mouth 2 times daily        FLUoxetine 20 MG capsule    PROzac    90 capsule    Take 1 capsule (20 mg) by mouth daily    Adjustment disorder with depressed mood       ipratropium - albuterol 0.5 mg/2.5 mg/3 mL 0.5-2.5 (3) MG/3ML neb solution    DUONEB     Take 1 vial by nebulization every 6 hours as needed for shortness of breath / dyspnea or wheezing        methadone 5 MG tablet    DOLOPHINE     Take 10 mg by mouth 2 times daily        metolazone 5 MG tablet    ZAROXOLYN     Take 5 mg by mouth M,W,F        MUCINEX 600 MG 12 hr tablet   Generic drug:  guaiFENesin      Take 600 mg by mouth 2 times daily as needed        order for DME     1 Device    Equipment being ordered: Wheelchair-electric, [employer] mobility device Face to face exam Sept 25th, 2013 Diagnosis COPD(496), CHF (428) Length of need 99 months Weight 303#    Chronic airway obstruction, not elsewhere classified, Congestive heart failure, unspecified       polyethylene glycol powder    MIRALAX/GLYCOLAX     Take 1 capful by mouth daily as needed for constipation        potassium chloride 10 MEQ CR capsule    MICRO-K     Take 10 mEq by mouth 2 times daily        pravastatin 80 MG tablet    PRAVACHOL    90 tablet    Take 1 tablet (80 mg) by mouth daily    Hyperlipidemia LDL goal <100       SYSTANE 0.4-0.3 % Soln ophthalmic solution   Generic drug:  polyethylene glycol 0.4%- propylene glycol 0.3%      Place 1 drop into both eyes 4 times daily as needed for dry eyes        torsemide 20 MG tablet    DEMADEX    360 tablet    40 mg in AM and 40 mg at noon    Generalized edema       traZODone 50 MG tablet    DESYREL     Take by mouth At Bedtime 1-2 tabs at bedtime        triamcinolone acetonide 0.05 % Oint     90 g    Apply Topically two times a day as needed    Rash       ULTICARE SHORT 31G X 8 MM   Generic drug:  insulin pen needle     300 each    USE ONE FOUR TIMES A DAY (TWO WITH LANTUS AND TWO WITH BYETTA)     Type 2 diabetes, HbA1C goal < 8% (H)

## 2018-06-02 NOTE — TELEPHONE ENCOUNTER
"  Reason for Disposition    [1] Caller requesting NON-URGENT health information AND [2] PCP's office is the best resource     \"I am waiting on my accu check machine. Someone from [employer] was suppose to be working on it for 3 days now. I got all of the other supplies, nit the machine. \" Nothing noted per epic. Advised to call his PCP on Monday when open to discuss.    Additional Information    Negative: [1] Caller is not with the adult (patient) AND [2] reporting urgent symptoms    Negative: Lab result questions    Negative: Medication questions    Negative: Caller cannot be reached by phone    Negative: Caller has already spoken to PCP or another triager    Negative: RN needs further essential information from caller in order to complete triage    Negative: Requesting regular office appointment    Protocols used: INFORMATION ONLY CALL-ADULT-    "

## 2018-06-05 NOTE — TELEPHONE ENCOUNTER
"Requested Prescriptions   Pending Prescriptions Disp Refills     blood glucose monitoring (ACCU-CHEK MULTICLIX) lancets 306 each 4     Sig: TEST BLOOD SUGARS THREE TIMES DAILY OR AS DIRECTED and device    Diabetic Supplies Protocol Passed    6/5/2018 10:10 AM       Passed - Patient is 18 years of age or older       Passed - Recent (6 mo) or future (30 days) visit within the authorizing provider's specialty    Patient had office visit in the last 6 months or has a visit in the next 30 days with authorizing provider.  See \"Patient Info\" tab in inbasket, or \"Choose Columns\" in Meds & Orders section of the refill encounter.              Last Written Prescription Date:  2/23/18  Last Fill Quantity: 306,  # refills: 4   Last Office Visit with G, P or [facility] prescribing provider:  3/12/18   Future Office Visit:       "

## 2018-06-06 NOTE — TELEPHONE ENCOUNTER
Prescription approved per [facility] Refill Protocol.  Brooklyn Schultz RN. . .  6/6/2018, 5:08 PM

## 2018-07-01 ENCOUNTER — MEDICAL CORRESPONDENCE (OUTPATIENT)
Dept: HEALTH INFORMATION MANAGEMENT | Facility: CLINIC | Age: 78
End: 2018-07-01

## 2019-05-16 NOTE — PROGRESS NOTES
"SUBJECTIVE/OBJECTIVE:                                                    Geo Martinez is a 76 year old male called for an initial visit for Medication Therapy Management.  He was referred to me from his [employer] insurance plan. Previously had chart reviews conducted with insurance plan. PCP is Dr. Khan.     Chief Complaint: Comprehensive Medication Review.  Personal Healthcare Goals: make sure he can function and is comfortable    Allergies/ADRs: Reviewed in Epic  Tobacco: History of tobacco dependence - quit 30 years ago   Alcohol: none  Caffeine: 2-3 cups/day of coffee  Activity: Limited due to his arthritis/bad back.    PMH: Reviewed in Epic    Medication Adherence: no issues reported and sets up own med boxes    HFrEF/HTN/Afib/Edema: Current medications include carvedilol 6.25 mg - three tablets (18.75 mg) twice daily, lisinopril 20 mg once daily, torsemide 40 mg QAM and 40 mg at noon.  Patient was admitted into hospice as cardiologist noted that his prognosis was \"guarded to poor\". Some shortness of breath recently, but denies any issues with fluid retention.  At last cardiology appointment, pt was feeling well and his cardiologist felt he was well compensated.   ECHO:  Date 3/26/14, EF 25-30%  Pt is complaining of sx of HFof: shortness of breath, per 3/4/16 PCP note.  Pt is measuring daily weights and they have been stable. Pt is watching sodium intake. Pt is avoiding EtOH.    COPD: Current medications: Tiotropium (Spiriva) once daily, Duonebs four times daily PRN (using more lately, but not in last few days), and albuterol prescribed as 2 puffs 4 times daily as needed.  Has prescription for Advair, but does not find it be helpful.  Does use Mucinex occasionally and finds helpful.   Pt reports the following symptoms: increased SOB upon exertion  Pt does not have an COPD Action Plan on file.   Received flu shot on 9/28/16.    Diabetes:  Pt currently taking Byetta 10mcg BID (only if blood sugars " "are above 100 mg/dL) and Lantus 25 units BID.  Pt is not experiencing side effects.   SMB times daily  Ranges (patient reported): 90 mg/dL this morning, usually around  mg/dL  Symptoms of low blood sugar? Shaky, sweaty  Recent symptoms of high blood sugar? Once every one to two weeks  Eye exam: up to date  Foot exam: due  Microalbumin is < 30 mg/g. Pt is taking an ACEi/ARB.  Aspirin: Taking 81mg daily, no mention of side effects.    Gout: Currently taking allopurinol 100mg daily and colchicine PRN gout flares.   Last true flare was years ago, did notice some issues in the last few months, but nothing came of it.  Uric Acid   Date Value Ref Range Status   06/10/2015 5.4 3.5 - 7.2 mg/dL Final     Hyperlipidemia: Current therapy includes pravastatin 80mg daily and fish oil 1200 mg twice daily. Slight rhabdomyolysis noted on hospitalization at [facility] in  while taking Vytorin, so Vytorin was discontinued. Denies any issues with myalgias or other side effects.    Back Pain: Currently taking methadone 10 mg twice daily and oxycodone 10 mg Q8H PRN pain. Pain control now being managed by hospice team from [facility].  States that he stills has been, but he tolerates it and he knows it will not be completely gone. Denies any side effects - has Miralax available for constipation.      Depression/Insomnia:  Current medications include: Fluoxetine 20 mg once daily, Lorazepam 1 mg daily at bedtime (patient states hospice team started this), and trazodone 50 mg at bedtime. Pt states that his mood has been \"pretty good\".  Sleeping well - had issues with lack of sleep on Ambien. Denies any side effects.  PHQ-9 SCORE 2013 3/25/2014 10/6/2015   Total Score 4 1 -   Total Score - - 18     Supplements: Currently taking a daily Eye vitamin, Resetigen-D daily, and ferrous sulfate 325 mg daily. Finds supplements to be helfpul. Pt denies any issues.     Current labs include:  BP Readings from Last 3 Encounters: " asked to locate 3 items for recall during this time. Problem solving for given situations was targeted within the environment which focused on safety for maneuvering around tight areas, getting items from low and high places, getting items out of a pop cooler, and problem solving situations with various surfaces. Pt was given situations in which adaptive equipment or requests for assist were also needed. Pain: denies  Current Diet: DIET CARDIAC; Low Sodium (2 GM)  Subjective: Pt alert and cooperative      Goals and POC:  LTG                       Short-term Goals  Timeframe for Short-term Goals: Improve overall cognition and memory for maximum functioning for discharge home. Meeting goal  Goal 1: Pt will complete medication management activities with 90% acc w min cues from caregivers. Goal met 5/14/19  Goal 2: Pt will be observed/state safety awareness during functional activities with 90% acc w min cues from caregivers. Goal met this date at 90% with occasional cues. Good problem solving and insight. FIM 6  Goal 3: Pt will complete immediate recall tasks w 80% acc. Goal met, discontinue for 3/3 items. Goal 4: Pt will complete delayed recall tasks (5-10 mins) w 80% acc. Min cues-- 2/3 items spontaneously and min cues to recall 3rd item. FIM 5     Pt being discharged from [address] [place] [address] with no further tx recommended. Pt will likely perform well in familiar environment. Occasional cues needed for recall of new info for carryover--once implemented,  practical application recall improves. Prognosis for continued improvement is good. Alarm placed: []bed []chair   []other:   []LORAINE activated  Pt continued with PT.         Barriers to progress:   [] Fatigue        [] Cognitive Deficits   [] Memory Deficits   [] Reduced Attn   [] Self Limiting Behaviors    [] Reduced insight/awareness     [] Visual Deficits   [] Premorbid Conditions     [] Other      Education/Interventions used this date: Reviewed safety   02/21/17 108/66   09/28/16 98/58   09/15/16 90/52     Today's Vitals: There were no vitals taken for this visit. - telephone encounter  Lab Results   Component Value Date    A1C 5.2 02/21/2017   .  Lab Results   Component Value Date    CHOL 129 06/10/2015     Lab Results   Component Value Date    TRIG 162 06/10/2015     Lab Results   Component Value Date    HDL 29 06/10/2015     Lab Results   Component Value Date    LDL 68 06/10/2015       Liver Function Studies -   Recent Labs   Lab Test  08/02/16   1346   PROTTOTAL  7.9   ALBUMIN  3.4   BILITOTAL  0.5   ALKPHOS  74   AST  22   ALT  23       Lab Results   Component Value Date    UCRR 44 06/10/2015    MICROL 18 06/10/2015    UMALCR 42.14 (H) 06/10/2015       Last Basic Metabolic Panel:  Lab Results   Component Value Date     02/21/2017      Lab Results   Component Value Date    POTASSIUM 3.7 02/21/2017     Lab Results   Component Value Date    CHLORIDE 98 02/21/2017     Lab Results   Component Value Date    BUN 23 02/21/2017     Lab Results   Component Value Date    CR 0.70 02/21/2017     GFR Estimate   Date Value Ref Range Status   02/21/2017 >90  Non  GFR Calc   >60 mL/min/1.7m2 Final   09/30/2016 34 (L) >60 mL/min/1.7m2 Final     Comment:     Non  GFR Calc   09/28/2016 34 (L) >60 mL/min/1.7m2 Final     Comment:     Non  GFR Calc     TSH   Date Value Ref Range Status   03/25/2014 1.86 0.4 - 5.0 mU/L Final     Most Recent Immunizations   Administered Date(s) Administered     Influenza (H1N1) 12/10/2009     Influenza (High Dose) 3 valent vaccine 09/28/2016     Influenza (IIV3) 11/14/2012     Pneumococcal 23 valent 02/04/2010     TD (ADULT, 7+) 02/05/2009     ASSESSMENT:                                                       Current medications were reviewed today. Medicare Part D topics discussed:Medications reviewed-no issues identified or changes needed    Medication Adherence: no issues  and recall. [x]   Progress was updated and reviewed in [facility] with patient and/or family this date. [] Attending Care Conference for pt this date. See Team Patient Care Conference Note for updates.     Interventions used this date:  [] Speech/Language Treatment    [] Instruction in HEP    Group   [] Dysphagia Treatment   [] Cognitive Skill Ben    Other:         Assessment / Impression                                                          Treatment/Activity Tolerance:   [x] Tolerated Treatment well:     [] Patient limited by fatigue/pain:       [] Patient limited by medical complications:    [] Adverse Reaction to Tx:   [] Significant change in status:      Electronically Signed by  Juan Kemp MA, CCC-SLP    5/16/2019  10:49 AM. identified    HFrEF/HTN/Afib/Edema: Stable. Pt states that he feels good - continues to be enrolled in hospice. Last BPs are at goal of <140/90 mmHg.    COPD: Stable.    Diabetes: Stable. Patient is meeting A1c goal of < 7%. Self monitoring of blood glucose is at goal of fasting  mg/dL.    Gout: Stable.    Hyperlipidemia: Stable. Pt is on moderate intensity statin which is indicated based on 2013 ACC/AHA guidelines for lipid management.      Back Pain: Stable per patient.      Depression/Insomnia:  Stable per patient. Long term use of benzodiazepine may potentially require closer monitoring, but patient seems to be tolerating and is being followed by hospice team.     Supplements: Stable.     PLAN:                                                      1. Continue current medications.    I spent 45 minutes with this patient today (an extra 15 minutes was spent creating the Medication Action Plan). A copy of the visit note was provided to the patient's primary care provider.    Will follow up in 6 months or sooner if needed.    The patient was mailed a summary of these recommendations as an after visit summary.     Bill Jacobo PharmD  Medication Therapy Management Provider  Pager (voicemail): [phone]

## 2024-06-07 NOTE — PATIENT INSTRUCTIONS
"Sent via letter \"Medication Action Plan\"  " Patient to indicate to staff if he presents with any particular food preferences./food preferences as requested by patient (specify)